# Patient Record
Sex: FEMALE | Employment: OTHER | URBAN - METROPOLITAN AREA
[De-identification: names, ages, dates, MRNs, and addresses within clinical notes are randomized per-mention and may not be internally consistent; named-entity substitution may affect disease eponyms.]

---

## 2017-05-25 ENCOUNTER — OFFICE VISIT (OUTPATIENT)
Dept: RHEUMATOLOGY | Facility: CLINIC | Age: 67
End: 2017-05-25
Attending: INTERNAL MEDICINE
Payer: MEDICARE

## 2017-05-25 VITALS
HEIGHT: 65 IN | WEIGHT: 131 LBS | DIASTOLIC BLOOD PRESSURE: 89 MMHG | OXYGEN SATURATION: 98 % | BODY MASS INDEX: 21.83 KG/M2 | SYSTOLIC BLOOD PRESSURE: 145 MMHG | HEART RATE: 77 BPM

## 2017-05-25 DIAGNOSIS — I15.0 RENOVASCULAR HYPERTENSION: Primary | ICD-10-CM

## 2017-05-25 DIAGNOSIS — M34.9 SCLERODERMA (H): ICD-10-CM

## 2017-05-25 DIAGNOSIS — I73.00 RAYNAUD'S DISEASE WITHOUT GANGRENE: ICD-10-CM

## 2017-05-25 PROCEDURE — 99212 OFFICE O/P EST SF 10 MIN: CPT | Mod: ZF

## 2017-05-25 RX ORDER — ATORVASTATIN CALCIUM 20 MG/1
20 TABLET, FILM COATED ORAL DAILY
Qty: 90 TABLET | Refills: 0 | Status: SHIPPED | OUTPATIENT
Start: 2017-05-25

## 2017-05-25 ASSESSMENT — PAIN SCALES - GENERAL: PAINLEVEL: NO PAIN (0)

## 2017-05-25 NOTE — MR AVS SNAPSHOT
"              After Visit Summary   5/25/2017    Sadie Zapata    MRN: [id number]           Patient Information     Date Of Birth          1950        Visit Information        Provider Department      5/25/2017 10:00 AM Joseph Chery MD [facility] Rheumatology        Today's Diagnoses     Scleroderma (H)        HTN (hypertension)        Raynaud's syndrome           Follow-ups after your visit        Who to contact     If you have questions or need follow up information about today's clinic visit or your schedule please contact [facility] RHEUMATOLOGY directly at [phone].  Normal or non-critical lab and imaging results will be communicated to you by [employer], letter or phone within 4 business days after the clinic has received the results. If you do not hear from us within 7 days, please contact the clinic through [employer] or phone. If you have a critical or abnormal lab result, we will notify you by phone as soon as possible.  Submit refill requests through [employer] or call your pharmacy and they will forward the refill request to us. Please allow 3 business days for your refill to be completed.          Additional Information About Your Visit        MyChart Information     [employer] gives you secure access to your electronic health record. If you see a primary care provider, you can also send messages to your care team and make appointments. If you have questions, please call your primary care clinic.  If you do not have a primary care provider, please call [phone] and they will assist you.        Care EveryWhere ID     This is your Care EveryWhere ID. This could be used by other organizations to access your [place] medical records  [id number]        Your Vitals Were     Pulse Height Pulse Oximetry BMI (Body Mass Index)          77 1.638 m (5' 4.5\") 98% 22.14 kg/m2         Blood Pressure from Last 3 Encounters:   05/25/17 145/89   10/27/15 126/67   10/23/14 135/84    Weight from Last 3 Encounters: "   05/25/17 59.4 kg (131 lb)   10/27/15 56.2 kg (124 lb)   10/23/14 57.6 kg (127 lb)              Today, you had the following     No orders found for display         Today's Medication Changes          These changes are accurate as of: 5/25/17 11:09 AM.  If you have any questions, ask your nurse or doctor.               These medicines have changed or have updated prescriptions.        Dose/Directions    CENTRUM SILVER per tablet   This may have changed:  Another medication with the same name was removed. Continue taking this medication, and follow the directions you see here.   Used for:  Scleroderma (H), HTN (hypertension)   Changed by:  Joseph Chery MD        Dose:  1 tablet   Take 1 tablet by mouth daily   Refills:  0                Primary Care Provider Office Phone # Fax #    Earle Hayden [phone]       [place] [address]        Thank you!     Thank you for choosing [facility] RHEUMATOLOGY  for your care. Our goal is always to provide you with excellent care. Hearing back from our patients is one way we can continue to improve our services. Please take a few minutes to complete the written survey that you may receive in the mail after your visit with us. Thank you!             Your Updated Medication List - Protect others around you: Learn how to safely use, store and throw away your medicines at www.disposemymeds.org.          This list is accurate as of: 5/25/17 11:09 AM.  Always use your most recent med list.                   Brand Name Dispense Instructions for use    atorvastatin 20 MG tablet    LIPITOR    90 tablet    Take 1 tablet (20 mg) by mouth daily       CENTRUM SILVER per tablet      Take 1 tablet by mouth daily       enalapril 2.5 MG tablet    VASOTEC     Take 10 mg by mouth daily       HYDROCHLOROTHIAZIDE PO      Take 25 mg by mouth daily       OMEGA-3 FISH OIL PO      Take 1,400 mg by mouth 2 times daily (with meals)       VIAGRA 50  MG cap/tab   Generic drug:  sildenafil     90 tablet    Take 0.5 tablets (25 mg) by mouth 2 times daily as needed

## 2017-05-25 NOTE — LETTER
5/25/2017       RE: Sadie Zapata  [address]   [place] [id number]     Dear Colleague,    Thank you for referring your patient, Sadie Zapata, to the [facility] RHEUMATOLOGY at [facility]. Please see a copy of my visit note below.    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  Nonetheless, her method of onset of disease back in 2004 suggests this was diffuse cutaneous disease that is now in atrophic phase.      PLAN AND RECOMMENDATION:     She is RNA polymerase III positive, so I have again taken some time today to educate her on the potential significance of that and the need to continue to monitor blood pressure.     We also discussed that for any systemic sclerosis patient, it is probably prudent to do pulmonary function tests every 2-3 years and that once she establishes with a rheumatologist in [place] she probably wants to get set up to do that.      I also discussed with her that it might be reasonable to get off of hydrochlorothiazide and see if her blood pressure can be nicely controlled just with the ACE inhibitor, particularly given that she is now going to be living in a warm humid climate and she has the potentially get somewhat dehydrated with a hydrochlorothiazide regimen that may now be essentially unnecessary.      I did encourage her to get set up with primary care provider in [place].  I can certainly see her and keep her prescriptions going until she establishes with her rheumatologist, but we also gave her the name of a rheumatologist near where she lives who has experienced systemic sclerosis.  She will  consider getting in with him unless she finds someone closer.  She also would get established with a dermatologist given her multiple AKs, and history of multiple sun induced lesions.      I am happy to see her back at any time.  She is doing well and I am not sure she really needs to see me in the next 3 months before they go back to [place].  She will contact me if anything arises however.  This visit was 25 minutes in duration, over 50% spent in counseling.                        History of Present Illness:   Sadie Zapata is a 64 year old female who presents for REASON FOR VISIT:  [place] of care for longstanding diffuse cutaneous systemic sclerosis diagnosed by biopsy in 2004.       HISTORY OF PRESENT ILLNESS:  The patient, as noted, was diagnosed with diffuse cutaneous systemic sclerosis back in 05/2004.  This occurred shortly after she had had right-sided rotator cuff shoulder surgery, and she began developing diffuse swelling of the hands and feet.  She was seen by a dermatologist, who did a skin biopsy and suspected systemic sclerosis, and the skin biopsy was consistent with that.  An NICOLE was performed and was high titer, but Scl-70 antibodies and centromere antibodies were negative.  As far as I can tell, she has never had an RNA polymerase III antibody which was not available at that time.       She went to [place] in 12/2004, where the disease was confirmed.  With no particular specific treatments for it, she was not placed on any treatment at that time.       Since that time, she has remained relatively stable without a lot of severe progressive skin disease or a lot of musculoskeletal symptoms and with no evidence of progressive lung disease of any type.       Indeed, she brings today with her pulmonary function tests just performed this last week at [facility] in [place] which show her FVC at 106% of predicted, DLCO at 106% of predicted at 19.0, and an FVC of 33.3.  Her echocardiogram was also  performed showing a PA systolic pressure estimate of 17.6 mmHg above right atrial pressure with the only real abnormality slightly impaired left ventricular diastolic relaxation as is often seen in systemic sclerosis.  Symptomatically at this point, her major issue is Raynaud's phenomena.  Because her Raynaud's phenomena have been pretty severe affecting both hands and feet, Dr. Gutierrez, who the patient has been seeing since 2004 but who recently retired, has maintained her much of that time on low-dose sildenafil.  The patient typically only takes one-half of a 50-mg tablet a day but feels that does help with Raynaud's severity.      Her Raynaud's has never caused any digital ulcers or other digital ischemia in the fingers or the toes, and she does get attacks in both locations.  She is, however, very careful to ensure that she stays as warm as possible, uses gloves when she is handling very cold items, and breaks her attacks within 20 minutes.       As noted above, she does not have significant musculoskeletal features.  She does have contractures in her fingers, more notably on the left hand than on the right, but has never had ulcerations over the PIP joints or DIP joints.  She has minimal pain or morning stiffness in the hands, really no pain or morning stiffness in any other joints, and has not had other significant musculoskeletal symptoms.       She has never had any significant cardiac symptoms, denies any pleuritis or pericarditis, and to her knowledge has had no significant evidence of renal disease, though she has for many years had quite high blood pressures.  Dr. Gutierrez has maintained her on a very low dose of an ACE inhibitor.  As I noted, she has not had an RNA polymerase III antibody but reports she has had stable creatinine and urinalysis.  I do not have any of those in the recent records, however.       Her blood pressure is currently significantly better than it has been in the past, really only  borderline.  She describes this in no small part to having recently quit work and retired.  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  She has also now walking 2.5 miles a day which she tolerates very well, and she has a stable exercise tolerance when walking.  She uses a home blood pressure cuff once a week, and she says her blood pressures have been stable.      One of her major questions for me today is whether she might be able to get off the proton pump inhibitors.  She has been on those for the major part of the last 10 years.  She has no significant GERD or pyrosis symptomatically.  She has never had an upper endoscopy, however.       She denies other significant gastrointestinal symptoms, particularly denying features suggestive of bacterial small bowel overgrowth such as postprandial bloating, a lot of gas or diarrhea within a few hours of eating.  She really has no diarrhea or constipation and has had a stable weight for a long time.      She has never had calcinosis, never had keratoconjunctivitis sicca, never had neuropathies, and really denies most other features that are often associated with systemic sclerosis or other autoimmune connective tissue disease such as sun sensitivity.  She has never had a blood clot.  She has never had a miscarriage more than 2 months into a pregnancy.     INTERVAL HISTORY (10/27/2015):  Since I saw the patient on the initial visit documented above, she has done well clinically.  We did check her RNA polymerase III status and it was indeed positive in moderate titer.      She has, however, been watching her blood pressure and with the addition of some hydrochlorothiazide to her low-dose ACE inhibitor she is doing very well.  Both creatinine and urinalysis at her last visit were normal.        Her blood pressure today is  126/67 and from her description it  has recently been in the same general neighborhood.  She does remain on enalapril 10 mg a day together with hydrochlorothiazide 25 mg a day.      Her Raynaud's is starting to act up with the onset of colder weather.  She however has, per our last conversation, avoided sildenafil except as needed.  She has not had bad Raynaud's throughout the summer, and it is only in the last several days when it started getting much her that she has a little bit of difficulty.      She plans on taking care of that starting tomorrow by moving to [place].  She and her  have a lot and are building a house.  They plan on staying at least 7, maybe 8 months a year in [place] and coming back here only for the summer where they have a summer cabin.      Other than for her Raynaud's she really denies any significant features of her disease.  She has no significant GERD symptoms or dysphagia, and per my recommendation she did have an upper endoscopy at the time of her last visit when she got home and that was entirely normal, so given her lack of symptoms and the normal endoscopy.  She was told she could stop her PPI and she has remained off of it since with no increase in any of the symptoms, so she remains essentially asymptomatic.      She denies any other GI symptoms, any shortness of breath or cardiopulmonary symptoms of any kind, and really feels very good.      She has established with a scleroderma support group in [place], has not yet established with a physician there, but is discussing that with the support group.     INTERVAL HISTORY (05/25/2017):  Since we have last seen the patient, she has been in [place], [place], where she and her  had a house built, that they finally were able to get into after over a year in December.  They really like it there and are planning on moving there permanently, spending only a month or two here in the summer at a cabin on one of the [facility].  This year she will be here from June  to August.      While in [place] this winter almost all of her symptoms historically from her disease have been completely stable or improved.  She has had virtually no Raynaud's attacks this winter while in [place].      She did stay on the enalapril.  Recall that she has an RNA polymerase III antibody but is carefully monitoring her blood pressure and had no spikes in her blood pressure or trends upward.      She denies any significant GERD or dysphasia.  She has had an upper endoscopy in last several years and that was entirely normal.  She has remained off of PPI given that normal endoscopy and has no symptoms despite being off of it.      She does remain on hydrochlorothiazide together with the ACE inhibitor and she is also on a statin.  She has not yet established with a primary care physician in [place].                   Review of Systems:   Review Of Systems as above, and per parul Askew:   Skin: negative  Eyes: negative  Ears/Nose/Throat: negative  Respiratory: No shortness of breath, dyspnea on exertion, cough, or hemoptysis  Cardiovascular: negative  Gastrointestinal: negative  Genitourinary: negative  Musculoskeletal: negative  Neurologic: negative  Psychiatric: negative  Hematologic/Lymphatic/Immunologic: negative  Endocrine: negative          Past Medical History:     Past Medical History:   Diagnosis Date     Hypertension      Malignant neoplasm (H)     melanoma     Raynaud disease      Scleroderma (H) julianna       Patient Active Problem List    Diagnosis Date Noted     Renovascular hypertension 10/27/2015     Priority: Medium     Scleroderma (H) 12/17/2012     Priority: Medium     Disorder of bursae and tendons in shoulder region 12/17/2012     Priority: Medium     Problem list name updated by automated process. Provider to review               Past Surgical History:     Past Surgical History:   Procedure Laterality Date     CHOLECYSTECTOMY       ENT SURGERY      tonsillectomy & adenoidectomy  "    GENITOURINARY SURGERY      hysterectomy '78     SHOULDER SURGERY      2 on right; 1 on left             Social History:     Social History   Substance Use Topics     Smoking status: Never Smoker     Smokeless tobacco: Never Used     Alcohol use Yes             Family History:   No family history on file.  No autoimmune fHX; CA in several aunts.        Allergies:   No Known Allergies          Medications:     Current Outpatient Prescriptions   Medication Sig Dispense Refill     sildenafil (VIAGRA) 50 MG tablet Take 0.5 tablets (25 mg) by mouth 2 times daily as needed 90 tablet 3     atorvastatin (LIPITOR) 20 MG tablet Take 1 tablet (20 mg) by mouth daily 90 tablet 3     Omega-3 Fatty Acids (OMEGA-3 FISH OIL PO) Take 1,400 mg by mouth 2 times daily (with meals)       Multiple Vitamins-Minerals (CENTRUM SILVER) per tablet Take 1 tablet by mouth daily       HYDROCHLOROTHIAZIDE PO Take 25 mg by mouth daily       enalapril (VASOTEC) 2.5 MG tablet Take 10 mg by mouth daily               Physical Exam:   Blood pressure 145/89, pulse 77, height 1.638 m (5' 4.5\"), weight 59.4 kg (131 lb), SpO2 98 %.  Constitutional: WD-WN-WG cooperative  Eyes: nl EOM, PERRLA, vision, conjunctiva, sclera  ENT: nl external ears, nose, hearing, lips, teeth, gums, throat  No mucous membrane lesions, normal saliva pool  Neck: no mass or thyroid enlargement  Resp: lungs clear to auscultation, nl to palpation  CV: RRR, no murmurs, rubs or gallops, no edema  GI: no ABD mass or tenderness, no HSM  : not tested  Lymph: no cervical, supraclavicular, inguinal or epitrochlear nodes  MS: All TMJ, neck, shoulder, elbow, wrist, MCP/PIP/DIP, spine, hip, knee, ankle, and foot MTP/IP joints were examined and  found normal. Trace synovitis, NT in bilat. 2,3 MCPs, slight contractures right fingers, approx 30 degree contractures left ifingers worst in 3rd finger. No other active synovitis or deformity. Full ROM.  Normal  strength. No dactylitis,  " tenosynovitis, enthespathy; no tendon rubs.    Skin: active RP bilat hands and feet sans DU.  She has diffusely atrophic skin with multiple areas of telangiectasias over her face, chest, hands and arms, in particular.  She also has many moles and other skin lesions from sun exposure.  She really only has low-grade skin thickening in the fingers and the face other than for her contractures but has active cyanosis consistent with Raynaud's phenomena in the hands and the feet.  She has no distal digital ulcers or pitting and no evidence of any calcinosis lesions.     no nail pitting, alopecia, rash, nodules or lesions  Neuro: nl cranial nerves, strength, sensation, DTRs.   Psych: nl judgement, orientation, memory, affect.         Data:     Lab Results   Component Value Date    WBC 7.2 3/27/2006    WBC 6.4 2/17/2005    HGB 14.4 3/27/2006    HGB 14.5 2/17/2005    HCT 42.5 3/27/2006    HCT 41.7 2/17/2005    MCV 93 3/27/2006    MCV 92 2/17/2005     3/27/2006     2/17/2005     No results found for this basename: BUN, CREATININE     No components found with this basename: SEDRATE     No results found for this basename: TSH     Lab Results   Component Value Date    AST 26 2/17/2005    ALT 17 2/17/2005     Reviewed Rheumatology lab flowsheet      Again, thank you for allowing me to participate in the care of your patient.      Sincerely,    Joseph Chery MD

## 2017-05-25 NOTE — PROGRESS NOTES
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  Nonetheless, her method of onset of disease back in 2004 suggests this was diffuse cutaneous disease that is now in atrophic phase.      PLAN AND RECOMMENDATION:     She is RNA polymerase III positive, so I have again taken some time today to educate her on the potential significance of that and the need to continue to monitor blood pressure.     We also discussed that for any systemic sclerosis patient, it is probably prudent to do pulmonary function tests every 2-3 years and that once she establishes with a rheumatologist in [place] she probably wants to get set up to do that.      I also discussed with her that it might be reasonable to get off of hydrochlorothiazide and see if her blood pressure can be nicely controlled just with the ACE inhibitor, particularly given that she is now going to be living in a warm humid climate and she has the potentially get somewhat dehydrated with a hydrochlorothiazide regimen that may now be essentially unnecessary.      I did encourage her to get set up with primary care provider in [place].  I can certainly see her and keep her prescriptions going until she establishes with her rheumatologist, but we also gave her the name of a rheumatologist near where she lives who has experienced systemic sclerosis.  She will consider getting in with him unless she finds someone closer.  She also would get established with a dermatologist given her multiple AKs, and history of multiple sun induced lesions.      I am happy to see her back at any time.  She is doing well and I am not sure she  really needs to see me in the next 3 months before they go back to [place].  She will contact me if anything arises however.  This visit was 25 minutes in duration, over 50% spent in counseling.                        History of Present Illness:   Sadie Zapata is a 64 year old female who presents for REASON FOR VISIT:  [place] of care for longstanding diffuse cutaneous systemic sclerosis diagnosed by biopsy in 2004.       HISTORY OF PRESENT ILLNESS:  The patient, as noted, was diagnosed with diffuse cutaneous systemic sclerosis back in 05/2004.  This occurred shortly after she had had right-sided rotator cuff shoulder surgery, and she began developing diffuse swelling of the hands and feet.  She was seen by a dermatologist, who did a skin biopsy and suspected systemic sclerosis, and the skin biopsy was consistent with that.  An NICOLE was performed and was high titer, but Scl-70 antibodies and centromere antibodies were negative.  As far as I can tell, she has never had an RNA polymerase III antibody which was not available at that time.       She went to [place] in 12/2004, where the disease was confirmed.  With no particular specific treatments for it, she was not placed on any treatment at that time.       Since that time, she has remained relatively stable without a lot of severe progressive skin disease or a lot of musculoskeletal symptoms and with no evidence of progressive lung disease of any type.       Indeed, she brings today with her pulmonary function tests just performed this last week at [facility] in [place] which show her FVC at 106% of predicted, DLCO at 106% of predicted at 19.0, and an FVC of 33.3.  Her echocardiogram was also performed showing a PA systolic pressure estimate of 17.6 mmHg above right atrial pressure with the only real abnormality slightly impaired left ventricular diastolic relaxation as is often seen in systemic sclerosis.  Symptomatically at this point, her major issue is  Raynaud's phenomena.  Because her Raynaud's phenomena have been pretty severe affecting both hands and feet, Dr. Gutierrez, who the patient has been seeing since 2004 but who recently retired, has maintained her much of that time on low-dose sildenafil.  The patient typically only takes one-half of a 50-mg tablet a day but feels that does help with Raynaud's severity.      Her Raynaud's has never caused any digital ulcers or other digital ischemia in the fingers or the toes, and she does get attacks in both locations.  She is, however, very careful to ensure that she stays as warm as possible, uses gloves when she is handling very cold items, and breaks her attacks within 20 minutes.       As noted above, she does not have significant musculoskeletal features.  She does have contractures in her fingers, more notably on the left hand than on the right, but has never had ulcerations over the PIP joints or DIP joints.  She has minimal pain or morning stiffness in the hands, really no pain or morning stiffness in any other joints, and has not had other significant musculoskeletal symptoms.       She has never had any significant cardiac symptoms, denies any pleuritis or pericarditis, and to her knowledge has had no significant evidence of renal disease, though she has for many years had quite high blood pressures.  Dr. Gutierrez has maintained her on a very low dose of an ACE inhibitor.  As I noted, she has not had an RNA polymerase III antibody but reports she has had stable creatinine and urinalysis.  I do not have any of those in the recent records, however.       Her blood pressure is currently significantly better than it has been in the past, really only borderline.  She describes this in no small part to having recently quit work and retired.  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  She has also now  walking 2.5 miles a day which she tolerates very well, and she has a stable exercise tolerance when walking.  She uses a home blood pressure cuff once a week, and she says her blood pressures have been stable.      One of her major questions for me today is whether she might be able to get off the proton pump inhibitors.  She has been on those for the major part of the last 10 years.  She has no significant GERD or pyrosis symptomatically.  She has never had an upper endoscopy, however.       She denies other significant gastrointestinal symptoms, particularly denying features suggestive of bacterial small bowel overgrowth such as postprandial bloating, a lot of gas or diarrhea within a few hours of eating.  She really has no diarrhea or constipation and has had a stable weight for a long time.      She has never had calcinosis, never had keratoconjunctivitis sicca, never had neuropathies, and really denies most other features that are often associated with systemic sclerosis or other autoimmune connective tissue disease such as sun sensitivity.  She has never had a blood clot.  She has never had a miscarriage more than 2 months into a pregnancy.     INTERVAL HISTORY (10/27/2015):  Since I saw the patient on the initial visit documented above, she has done well clinically.  We did check her RNA polymerase III status and it was indeed positive in moderate titer.      She has, however, been watching her blood pressure and with the addition of some hydrochlorothiazide to her low-dose ACE inhibitor she is doing very well.  Both creatinine and urinalysis at her last visit were normal.        Her blood pressure today is  126/67 and from her description it has recently been in the same general neighborhood.  She does remain on enalapril 10 mg a day together with hydrochlorothiazide 25 mg a day.      Her Raynaud's is starting to act up with the onset of colder weather.  She however has, per our last conversation, avoided  sildenafil except as needed.  She has not had bad Raynaud's throughout the summer, and it is only in the last several days when it started getting much her that she has a little bit of difficulty.      She plans on taking care of that starting tomorrow by moving to [place].  She and her  have a lot and are building a house.  They plan on staying at least 7, maybe 8 months a year in [place] and coming back here only for the summer where they have a summer cabin.      Other than for her Raynaud's she really denies any significant features of her disease.  She has no significant GERD symptoms or dysphagia, and per my recommendation she did have an upper endoscopy at the time of her last visit when she got home and that was entirely normal, so given her lack of symptoms and the normal endoscopy.  She was told she could stop her PPI and she has remained off of it since with no increase in any of the symptoms, so she remains essentially asymptomatic.      She denies any other GI symptoms, any shortness of breath or cardiopulmonary symptoms of any kind, and really feels very good.      She has established with a scleroderma support group in [place], has not yet established with a physician there, but is discussing that with the support group.     INTERVAL HISTORY (05/25/2017):  Since we have last seen the patient, she has been in [place], [place], where she and her  had a house built, that they finally were able to get into after over a year in December.  They really like it there and are planning on moving there permanently, spending only a month or two here in the summer at a cabin on [place] of [place] [facility].  This year she will be here from June to August.      While in [place] this winter almost all of her symptoms historically from her disease have been completely stable or improved.  She has had virtually no Raynaud's attacks this winter while in [place].      She did stay on the enalapril.  Recall that  she has an RNA polymerase III antibody but is carefully monitoring her blood pressure and had no spikes in her blood pressure or trends upward.      She denies any significant GERD or dysphasia.  She has had an upper endoscopy in last several years and that was entirely normal.  She has remained off of PPI given that normal endoscopy and has no symptoms despite being off of it.      She does remain on hydrochlorothiazide together with the ACE inhibitor and she is also on a statin.  She has not yet established with a primary care physician in [place].                   Review of Systems:   Review Of Systems as above, and per parul Askew:   Skin: negative  Eyes: negative  Ears/Nose/Throat: negative  Respiratory: No shortness of breath, dyspnea on exertion, cough, or hemoptysis  Cardiovascular: negative  Gastrointestinal: negative  Genitourinary: negative  Musculoskeletal: negative  Neurologic: negative  Psychiatric: negative  Hematologic/Lymphatic/Immunologic: negative  Endocrine: negative          Past Medical History:     Past Medical History:   Diagnosis Date     Hypertension      Malignant neoplasm (H)     melanoma     Raynaud disease      Scleroderma (H) julianna       Patient Active Problem List    Diagnosis Date Noted     Renovascular hypertension 10/27/2015     Priority: Medium     Scleroderma (H) 12/17/2012     Priority: Medium     Disorder of bursae and tendons in shoulder region 12/17/2012     Priority: Medium     Problem list name updated by automated process. Provider to review               Past Surgical History:     Past Surgical History:   Procedure Laterality Date     CHOLECYSTECTOMY       ENT SURGERY      tonsillectomy & adenoidectomy     GENITOURINARY SURGERY      hysterectomy '78     SHOULDER SURGERY      2 on right; 1 on left             Social History:     Social History   Substance Use Topics     Smoking status: Never Smoker     Smokeless tobacco: Never Used     Alcohol use Yes              "Family History:   No family history on file.  No autoimmune fHX; CA in several aunts.        Allergies:   No Known Allergies          Medications:     Current Outpatient Prescriptions   Medication Sig Dispense Refill     sildenafil (VIAGRA) 50 MG tablet Take 0.5 tablets (25 mg) by mouth 2 times daily as needed 90 tablet 3     atorvastatin (LIPITOR) 20 MG tablet Take 1 tablet (20 mg) by mouth daily 90 tablet 3     Omega-3 Fatty Acids (OMEGA-3 FISH OIL PO) Take 1,400 mg by mouth 2 times daily (with meals)       Multiple Vitamins-Minerals (CENTRUM SILVER) per tablet Take 1 tablet by mouth daily       HYDROCHLOROTHIAZIDE PO Take 25 mg by mouth daily       enalapril (VASOTEC) 2.5 MG tablet Take 10 mg by mouth daily               Physical Exam:   Blood pressure 145/89, pulse 77, height 1.638 m (5' 4.5\"), weight 59.4 kg (131 lb), SpO2 98 %.  Constitutional: WD-WN-WG cooperative  Eyes: nl EOM, PERRLA, vision, conjunctiva, sclera  ENT: nl external ears, nose, hearing, lips, teeth, gums, throat  No mucous membrane lesions, normal saliva pool  Neck: no mass or thyroid enlargement  Resp: lungs clear to auscultation, nl to palpation  CV: RRR, no murmurs, rubs or gallops, no edema  GI: no ABD mass or tenderness, no HSM  : not tested  Lymph: no cervical, supraclavicular, inguinal or epitrochlear nodes  MS: All TMJ, neck, shoulder, elbow, wrist, MCP/PIP/DIP, spine, hip, knee, ankle, and foot MTP/IP joints were examined and  found normal. Trace synovitis, NT in bilat. 2,3 MCPs, slight contractures right fingers, approx 30 degree contractures left ifingers worst in 3rd finger. No other active synovitis or deformity. Full ROM.  Normal  strength. No dactylitis,  tenosynovitis, enthespathy; no tendon rubs.    Skin: active RP bilat hands and feet sans DU.  She has diffusely atrophic skin with multiple areas of telangiectasias over her face, chest, hands and arms, in particular.  She also has many moles and other skin lesions from " sun exposure.  She really only has low-grade skin thickening in the fingers and the face other than for her contractures but has active cyanosis consistent with Raynaud's phenomena in the hands and the feet.  She has no distal digital ulcers or pitting and no evidence of any calcinosis lesions.     no nail pitting, alopecia, rash, nodules or lesions  Neuro: nl cranial nerves, strength, sensation, DTRs.   Psych: nl judgement, orientation, memory, affect.         Data:     Lab Results   Component Value Date    WBC 7.2 3/27/2006    WBC 6.4 2/17/2005    HGB 14.4 3/27/2006    HGB 14.5 2/17/2005    HCT 42.5 3/27/2006    HCT 41.7 2/17/2005    MCV 93 3/27/2006    MCV 92 2/17/2005     3/27/2006     2/17/2005     No results found for this basename: BUN, CREATININE     No components found with this basename: SEDRATE     No results found for this basename: TSH     Lab Results   Component Value Date    AST 26 2/17/2005    ALT 17 2/17/2005     Reviewed Rheumatology lab flowsheet    Joseph Chery

## 2017-05-25 NOTE — NURSING NOTE
"Chief Complaint   Patient presents with     RECHECK       Initial /89  Pulse 77  Ht 1.638 m (5' 4.5\")  Wt 59.4 kg (131 lb)  SpO2 98%  BMI 22.14 kg/m2 Estimated body mass index is 22.14 kg/(m^2) as calculated from the following:    Height as of this encounter: 1.638 m (5' 4.5\").    Weight as of this encounter: 59.4 kg (131 lb).  Medication Reconciliation: complete  Medications have been reviewed by me and are current to the best of my knowledge and ability.    "

## 2017-05-25 NOTE — NURSING NOTE
"Chief Complaint   Patient presents with     RECHECK     scleroderma       Initial /89  Pulse 77  Ht 1.638 m (5' 4.5\")  Wt 59.4 kg (131 lb)  SpO2 98%  BMI 22.14 kg/m2 Estimated body mass index is 22.14 kg/(m^2) as calculated from the following:    Height as of this encounter: 1.638 m (5' 4.5\").    Weight as of this encounter: 59.4 kg (131 lb).  Medication Reconciliation: complete  Medications have been reviewed by me and are current to the best of my knowledge and ability.    "

## 2017-08-23 DIAGNOSIS — M34.9 SCLERODERMA (H): ICD-10-CM

## 2017-08-23 DIAGNOSIS — I15.0 RENOVASCULAR HYPERTENSION: ICD-10-CM

## 2017-08-23 DIAGNOSIS — I73.00 RAYNAUD'S DISEASE WITHOUT GANGRENE: ICD-10-CM

## 2017-08-23 NOTE — TELEPHONE ENCOUNTER
atorvastatin (LIPITOR) 20 MG tablet      Last Written Prescription Date:  5/27/17  Last Fill Quantity: 90,   # refills: 0  Last Office Visit : 5/27/17  Future Office visit:  None. Plans to establish care in [place].    Routing refill request to provider for review/approval because:  Overdue AST and ALT labs. Per protocol, must be WNL within the last 6 months.      Lab Results   Component Value Date    AST 26 02/17/2005     Lab Results   Component Value Date    ALT 17 02/17/2005     Anuradha Resendiz RN, BSN.  8/23/2017 12:32 PM

## 2017-08-31 RX ORDER — ATORVASTATIN CALCIUM 20 MG/1
20 TABLET, FILM COATED ORAL DAILY
Qty: 90 TABLET | Refills: 1 | OUTPATIENT
Start: 2017-08-31

## 2019-04-15 ENCOUNTER — APPOINTMENT (RX ONLY)
Dept: URBAN - METROPOLITAN AREA CLINIC 151 | Facility: CLINIC | Age: 69
Setting detail: DERMATOLOGY
End: 2019-04-15

## 2019-04-15 DIAGNOSIS — Z85.820 PERSONAL HISTORY OF MALIGNANT MELANOMA OF SKIN: ICD-10-CM

## 2019-04-15 DIAGNOSIS — M34.83 SYSTEMIC SCLEROSIS WITH POLYNEUROPATHY: ICD-10-CM

## 2019-04-15 DIAGNOSIS — D18.0 HEMANGIOMA: ICD-10-CM

## 2019-04-15 DIAGNOSIS — L81.4 OTHER MELANIN HYPERPIGMENTATION: ICD-10-CM

## 2019-04-15 DIAGNOSIS — L57.0 ACTINIC KERATOSIS: ICD-10-CM

## 2019-04-15 PROBLEM — D18.01 HEMANGIOMA OF SKIN AND SUBCUTANEOUS TISSUE: Status: ACTIVE | Noted: 2019-04-15

## 2019-04-15 PROCEDURE — ? DEFER

## 2019-04-15 PROCEDURE — 99202 OFFICE O/P NEW SF 15 MIN: CPT

## 2019-04-15 PROCEDURE — ? COUNSELING

## 2019-04-15 ASSESSMENT — LOCATION SIMPLE DESCRIPTION DERM
LOCATION SIMPLE: LEFT FOOT
LOCATION SIMPLE: LEFT CALF
LOCATION SIMPLE: RIGHT HAND
LOCATION SIMPLE: RIGHT FOREARM
LOCATION SIMPLE: RIGHT PRETIBIAL REGION
LOCATION SIMPLE: LEFT FOREARM
LOCATION SIMPLE: LEFT HAND
LOCATION SIMPLE: LEFT PRETIBIAL REGION
LOCATION SIMPLE: ABDOMEN
LOCATION SIMPLE: LEFT UPPER BACK
LOCATION SIMPLE: RIGHT ANKLE
LOCATION SIMPLE: CHEST

## 2019-04-15 ASSESSMENT — LOCATION DETAILED DESCRIPTION DERM
LOCATION DETAILED: LEFT LATERAL ABDOMEN
LOCATION DETAILED: RIGHT MEDIAL SUPERIOR CHEST
LOCATION DETAILED: LEFT DORSAL FOOT
LOCATION DETAILED: RIGHT RIB CAGE
LOCATION DETAILED: RIGHT DISTAL PRETIBIAL REGION
LOCATION DETAILED: RIGHT PROXIMAL PRETIBIAL REGION
LOCATION DETAILED: LEFT PROXIMAL CALF
LOCATION DETAILED: RIGHT ANKLE
LOCATION DETAILED: LEFT SUPERIOR UPPER BACK
LOCATION DETAILED: LEFT RADIAL DORSAL HAND
LOCATION DETAILED: LEFT DISTAL PRETIBIAL REGION
LOCATION DETAILED: RIGHT PROXIMAL DORSAL FOREARM
LOCATION DETAILED: RIGHT DISTAL DORSAL FOREARM
LOCATION DETAILED: RIGHT ULNAR DORSAL HAND
LOCATION DETAILED: LEFT PROXIMAL RADIAL DORSAL FOREARM

## 2019-04-15 ASSESSMENT — LOCATION ZONE DERM
LOCATION ZONE: HAND
LOCATION ZONE: LEG
LOCATION ZONE: FEET
LOCATION ZONE: TRUNK
LOCATION ZONE: ARM

## 2019-04-15 NOTE — HPI: MELANOMA F/U (HISTORY OF MALIGNANT MELANOMA)
What Is The Reason For Today's Visit?: History of Melanoma
Year Excised?: 35 years ago patient stated

## 2019-04-17 ENCOUNTER — APPOINTMENT (RX ONLY)
Dept: URBAN - METROPOLITAN AREA CLINIC 151 | Facility: CLINIC | Age: 69
Setting detail: DERMATOLOGY
End: 2019-04-17

## 2019-04-17 DIAGNOSIS — L57.0 ACTINIC KERATOSIS: ICD-10-CM

## 2019-04-17 PROCEDURE — 96567 PDT DSTR PRMLG LES SKN: CPT

## 2019-04-17 PROCEDURE — ? PDT: BLUE

## 2019-04-17 ASSESSMENT — LOCATION DETAILED DESCRIPTION DERM
LOCATION DETAILED: LEFT PROXIMAL DORSAL FOREARM
LOCATION DETAILED: RIGHT PROXIMAL DORSAL FOREARM

## 2019-04-17 ASSESSMENT — LOCATION ZONE DERM: LOCATION ZONE: ARM

## 2019-04-17 ASSESSMENT — LOCATION SIMPLE DESCRIPTION DERM
LOCATION SIMPLE: LEFT FOREARM
LOCATION SIMPLE: RIGHT FOREARM

## 2019-04-17 NOTE — PROCEDURE: PDT: BLUE
Which Photosensitizer Was Used: Levulan
Incubation Start Time: 1:30pm
Debridement Text (Will Only Render In Visit Note If You Select Debridement Option Under Who Performed The Pdt Field): Prior to application of the photodynamic medication the hyperkeratotic lesions were curetted to make them more amenable to therapy.
Occlusion: No
Show Medical Necessity In Plan?: Yes
Incubation End Time: 4:30pm
Who Performed The Pdt?: Performed by Nurse, MA or Aesthetician ([id number])
Anesthesia Type: 1% lidocaine with epinephrine
Illumination Time: 00:17:15
Pre-Procedure Text: The treatment areas were cleaned and prepped in the usual fashion.
Medical Necessity: Precancerous Lesions
Consent: Written consent obtained.  The risks were reviewed with the patient including but not limited to: pigmentary changes, pain, blistering, scabbing, redness, and the remote possibility of scarring.
Post-Care Instructions: I reviewed with the patient in detail post-care instructions. Patient is to avoid sunlight for the next 2 days, and wear sun protection. Patients may expect sunburn like redness, discomfort and scabbing.
Light Source: Barron-U
Incubation Time: 03:00:00
Detail Level: Zone
Total Number Of Aks Treated (Optional To Report): 0
Treatment Number: 1

## 2019-04-19 ENCOUNTER — APPOINTMENT (RX ONLY)
Dept: URBAN - METROPOLITAN AREA CLINIC 151 | Facility: CLINIC | Age: 69
Setting detail: DERMATOLOGY
End: 2019-04-19

## 2019-04-19 DIAGNOSIS — L57.0 ACTINIC KERATOSIS: ICD-10-CM

## 2019-04-19 PROCEDURE — ? PDT: BLUE

## 2019-04-19 PROCEDURE — 96567 PDT DSTR PRMLG LES SKN: CPT

## 2019-04-19 ASSESSMENT — LOCATION SIMPLE DESCRIPTION DERM: LOCATION SIMPLE: CHEST

## 2019-04-19 ASSESSMENT — LOCATION ZONE DERM: LOCATION ZONE: TRUNK

## 2019-04-19 ASSESSMENT — LOCATION DETAILED DESCRIPTION DERM: LOCATION DETAILED: UPPER STERNUM

## 2019-04-19 NOTE — PROCEDURE: PDT: BLUE
Number Of Kerasticks/Tubes Billed For: 1
Treatment Number: 0
Incubation Time: 03:00
Detail Level: Zone
Anesthesia Type: 1% lidocaine with epinephrine
Incubation End Time: 3:00
Occlusion: No
Show Medical Necessity In Plan?: Yes
Which Photosensitizer Was Used: Levulan
Incubation Start Time: 12:00
Illumination Time: 00:17:15
Pre-Procedure Text: The treatment areas were cleaned and prepped in the usual fashion.
Debridement Text (Will Only Render In Visit Note If You Select Debridement Option Under Who Performed The Pdt Field): Prior to application of the photodynamic medication the hyperkeratotic lesions were curetted to make them more amenable to therapy.
Who Performed The Pdt?: Performed by Nurse, MA or Aesthetician ([id number])
Post-Care Instructions: I reviewed with the patient in detail post-care instructions. Patient is to avoid sunlight for the next 2 days, and wear sun protection. Patients may expect sunburn like redness, discomfort and scabbing.
Light Source: Barron-U
Consent: Written consent obtained.  The risks were reviewed with the patient including but not limited to: pigmentary changes, pain, blistering, scabbing, redness, and the remote possibility of scarring.
Medical Necessity: Precancerous Lesions

## 2019-04-24 ENCOUNTER — APPOINTMENT (RX ONLY)
Dept: URBAN - METROPOLITAN AREA CLINIC 151 | Facility: CLINIC | Age: 69
Setting detail: DERMATOLOGY
End: 2019-04-24

## 2019-04-24 DIAGNOSIS — L57.0 ACTINIC KERATOSIS: ICD-10-CM

## 2019-04-24 PROCEDURE — ? PDT: BLUE

## 2019-04-24 PROCEDURE — 96567 PDT DSTR PRMLG LES SKN: CPT

## 2019-04-24 ASSESSMENT — LOCATION DETAILED DESCRIPTION DERM
LOCATION DETAILED: LEFT DISTAL PRETIBIAL REGION
LOCATION DETAILED: RIGHT DISTAL PRETIBIAL REGION

## 2019-04-24 ASSESSMENT — LOCATION ZONE DERM: LOCATION ZONE: LEG

## 2019-04-24 ASSESSMENT — LOCATION SIMPLE DESCRIPTION DERM
LOCATION SIMPLE: LEFT PRETIBIAL REGION
LOCATION SIMPLE: RIGHT PRETIBIAL REGION

## 2019-04-24 NOTE — PROCEDURE: PDT: BLUE
Pre-Procedure Text: The treatment areas were cleaned and prepped in the usual fashion.
Incubation End Time: 11:45
Debridement Text (Will Only Render In Visit Note If You Select Debridement Option Under Who Performed The Pdt Field): Prior to application of the photodynamic medication the hyperkeratotic lesions were curetted to make them more amenable to therapy.
Anesthesia Type: 1% lidocaine with epinephrine
Who Performed The Pdt?: Performed by Nurse, MA or Aesthetician ([id number])
Was Levulan/Ameluz Applied On A Previous Day?: No
Post-Care Instructions: I reviewed with the patient in detail post-care instructions. Patient is to avoid sunlight for the next 2 days, and wear sun protection. Patients may expect sunburn like redness, discomfort and scabbing.
Consent: Written consent obtained.  The risks were reviewed with the patient including but not limited to: pigmentary changes, pain, blistering, scabbing, redness, and the remote possibility of scarring.
Medical Necessity: Precancerous Lesions
Illumination Time: 00:17:15
Show Anesthesia In Plan?: Yes
Treatment Number: 0
Light Source: Barron-U
Incubation Time: 03:00
Detail Level: Zone
Which Photosensitizer Was Used: Levulan
Number Of Kerasticks/Tubes Billed For: 1
Incubation Start Time: 08:45

## 2019-11-06 ENCOUNTER — HEALTH MAINTENANCE LETTER (OUTPATIENT)
Age: 69
End: 2019-11-06

## 2020-02-16 ENCOUNTER — HEALTH MAINTENANCE LETTER (OUTPATIENT)
Age: 70
End: 2020-02-16

## 2020-11-29 ENCOUNTER — HEALTH MAINTENANCE LETTER (OUTPATIENT)
Age: 70
End: 2020-11-29

## 2021-04-10 ENCOUNTER — HEALTH MAINTENANCE LETTER (OUTPATIENT)
Age: 71
End: 2021-04-10

## 2021-09-19 ENCOUNTER — HEALTH MAINTENANCE LETTER (OUTPATIENT)
Age: 71
End: 2021-09-19

## 2021-11-14 ENCOUNTER — HEALTH MAINTENANCE LETTER (OUTPATIENT)
Age: 71
End: 2021-11-14

## 2022-02-08 ENCOUNTER — APPOINTMENT (RX ONLY)
Dept: URBAN - METROPOLITAN AREA CLINIC 331 | Facility: CLINIC | Age: 72
Setting detail: DERMATOLOGY
End: 2022-02-08

## 2022-02-08 PROBLEM — C44.722 SQUAMOUS CELL CARCINOMA OF SKIN OF RIGHT LOWER LIMB, INCLUDING HIP: Status: ACTIVE | Noted: 2022-02-08

## 2022-02-08 PROCEDURE — ? ADDITIONAL NOTES

## 2022-02-08 PROCEDURE — 15275 SKIN SUB GRAFT FACE/NK/HF/G: CPT

## 2022-02-08 PROCEDURE — ? MOHS SURGERY

## 2022-02-08 PROCEDURE — 17311 MOHS 1 STAGE H/N/HF/G: CPT

## 2022-02-08 NOTE — PROCEDURE: MOHS SURGERY
Body Location Override (Optional - Billing Will Still Be Based On Selected Body Map Location If Applicable): right med foot

## 2022-02-08 NOTE — PROCEDURE: MOHS SURGERY
Consent 2/Introductory Paragraph: The rationale for Mohs surgery was explained to the patient and written informed consent was obtained. The risks, benefits, and alternatives to Mohs Surgery were discussed in detail. Specifically, the risks of infection, scarring, bleeding, prolonged wound healing, incomplete removal, allergy to anesthesia, nerve injury, and recurrence were addressed. Given the site on the leg, the patient was also counseled about the high risk of failure of surgical repair, wound falling apart (dehiscence), infection, and chronic wound formation, which could take months or longer to heal. The patient expressed understanding and desired to proceed. Prior to beginning the procedure, the surgical site was identified with the assistance of the patient and the medical record, including photographs and/or maps if available. The site was then clearly confirmed by the patient by direct visualization and/or the use of a hand mirror and a cotton-tipped applicator stick. All components of Universal Protocol/PAUSE Rule were completed: Prior to beginning the procedure, a pause was taken during which the surgeon, his assistant, and the patient verbally confirmed the patient's identification, the intended procedure, and the correct surgical site.

## 2022-02-08 NOTE — HPI: PROCEDURE (MOHS)
Has The Growth Been Previously Biopsied?: has been previously biopsied
Body Location Override (Optional): Right med foot

## 2022-02-08 NOTE — PROCEDURE: MOHS SURGERY
Mohs Histo Method Verbiage: The tissue was marked with dyes for orientation, mapped on an image, frozen, stained with toluidine blue, and microscopically examined by the surgeon.

## 2022-02-08 NOTE — PROCEDURE: MOHS SURGERY
Area M Indication Text: Tumors in this location are included in Area M (cheek, forehead, scalp, neck, jawline, and pretibial skin). Mohs surgery is indicated for tumors in these anatomic locations. Tissue conservation is critical in these anatomic locations to maximize functional and aesthetic outcomes.

## 2022-02-08 NOTE — PROCEDURE: MOHS SURGERY
Consent (Ear)/Introductory Paragraph: The rationale for Mohs was explained to the patient and consent was obtained. The risks, benefits and alternatives to therapy were discussed in detail. Specifically, the risks of ear deformity, infection, scarring, bleeding, prolonged wound healing, incomplete removal, allergy to anesthesia, nerve injury and recurrence were addressed. Prior to beginning the procedure, the surgical site was identified with the assistance of the patient and the medical record, including photographs and/or maps if available. The site was then clearly confirmed by the patient by direct visualization and/or the use of a hand mirror and a cotton-tipped applicator stick. All components of Universal Protocol/PAUSE Rule were completed: Prior to beginning the procedure, a pause was taken during which the surgeon, his assistant, and the patient verbally confirmed the patient's identification, the intended procedure, and the correct surgical site.

## 2022-02-08 NOTE — PROCEDURE: MOHS SURGERY
Medical Necessity Statement: Based on my medical judgement, Mohs surgery is medically necessary as it is the most appropriate treatment for this cancer compared to other treatments.

## 2022-02-08 NOTE — PROCEDURE: MOHS SURGERY
Consent (Spinal Accessory)/Introductory Paragraph: The rationale for Mohs surgery was explained to the patient and written informed consent was obtained. The risks, benefits, and alternatives to Mohs Surgery were discussed in detail. Specifically, the risks of infection, scarring, bleeding, prolonged wound healing, incomplete removal, allergy to anesthesia, nerve injury, and recurrence were addressed. Given the site, the patient was also counseled about the risk of damage to the spinal accessory nerve, potentially permanently resulting in chronic aching of the shoulders, inability to abduct the shoulder, wasting of the shoulder (trapezius) muscles, dropped shoulder, and paresthesias in the arm. The patient expressed understanding and desired to proceed. Prior to beginning the procedure, the surgical site was identified with the assistance of the patient and the medical record, including photographs and/or maps if available. The site was then clearly confirmed by the patient by direct visualization and/or the use of a hand mirror and a cotton-tipped applicator stick. All components of Universal Protocol/PAUSE Rule were completed: Prior to beginning the procedure, a pause was taken during which the surgeon, his assistant, and the patient verbally confirmed the patient's identification, the intended procedure, and the correct surgical site.

## 2022-02-08 NOTE — PROCEDURE: MOHS SURGERY
Mohs Method Verbiage: Mohs micrographic surgery was performed in the standard fashion with all frozen sections examined by the surgeon.  The discernable tumor mass and a narrow margin of surrounding skin, was excised as a microscopically-controlled section.

## 2022-02-08 NOTE — PROCEDURE: MOHS SURGERY
Consent (Scalp)/Introductory Paragraph: The rationale for Mohs was explained to the patient and consent was obtained. The risks, benefits and alternatives to therapy were discussed in detail. Specifically, the risks of changes in hair growth pattern secondary to repair, infection, scarring, bleeding, prolonged wound healing, incomplete removal, allergy to anesthesia, nerve injury and recurrence were addressed. Prior to beginning the procedure, the surgical site was identified with the assistance of the patient and the medical record, including photographs and/or maps if available. The site was then clearly confirmed by the patient by direct visualization and/or the use of a hand mirror and a cotton-tipped applicator stick. All components of Universal Protocol/PAUSE Rule were completed: Prior to beginning the procedure, a pause was taken during which the surgeon, his assistant, and the patient verbally confirmed the patient's identification, the intended procedure, and the correct surgical site.

## 2022-02-08 NOTE — PROCEDURE: MOHS SURGERY
Graft Donor Site Bandage (Optional-Leave Blank If You Don't Want In Note): Hemostasis was meticulously ensured achieved, gelfoam was applied, and a pressure dressing was placed.

## 2022-02-08 NOTE — PROCEDURE: MOHS SURGERY
Complex Repair Preamble Text (Leave Blank If You Do Not Want): To reduce the postoperative risks of hemorrhage, scarring, and infection, a complex linear repair was deemed to be medically necessary.

## 2022-02-08 NOTE — PROCEDURE: MOHS SURGERY
Intermediate Repair Preamble Text (Leave Blank If You Do Not Want): To reduce the postoperative risks of hemorrhage, scarring, and infection, an intermediate linear repair was deemed to be medically necessary.

## 2022-02-08 NOTE — PROCEDURE: MOHS SURGERY
Consent 3/Introductory Paragraph: The rationale for Mohs surgery was explained to the patient and written informed consent was obtained. The risks, benefits, and alternatives to Mohs Surgery were discussed in detail. Specifically, the risks of infection, scarring, bleeding, prolonged wound healing, incomplete removal, allergy to anesthesia, nerve injury, and recurrence were addressed. Given the site, the patient was also counseled about the risk of nerve damage, which could result in potentially permanent tingling, paresthesia, or lack of sensation on this side of the forehead and / or scalp. The patient expressed understanding and desired to proceed. Prior to beginning the procedure, the surgical site was identified with the assistance of the patient and the medical record, including photographs and/or maps if available. The site was then clearly confirmed by the patient by direct visualization and/or the use of a hand mirror and a cotton-tipped applicator stick. All components of Universal Protocol/PAUSE Rule were completed: Prior to beginning the procedure, a pause was taken during which the surgeon, his assistant, and the patient verbally confirmed the patient's identification, the intended procedure, and the correct surgical site.

## 2022-02-08 NOTE — PROCEDURE: MOHS SURGERY
Area L Indication Text: Tumors in this location are included in Area L (trunk and extremities).  Mohs surgery is indicated for larger tumors, tumors with aggressive histologic features, recurrent tumors, or tumors previously excised with positive margins in these anatomic locations.

## 2022-02-08 NOTE — PROCEDURE: MOHS SURGERY
Subsequent Stages Histo Method Verbiage: The presence of tumor at the surgical margins of the previous stage of Mohs micrographic surgery necessitated the excision of additional tissue for tumor clearance. Using a similar technique to that described above, a thin layer of tissue was removed from all areas where tumor was visible on the previous stage. The tissue was again marked with dyes for orientation, mapped on an image, frozen, stained, and microscopically examined by the surgeon.

## 2022-02-08 NOTE — PROCEDURE: MOHS SURGERY
Consent (Near Eyelid Margin)/Introductory Paragraph: The rationale for Mohs was explained to the patient and consent was obtained. The risks, benefits and alternatives to therapy were discussed in detail. Specifically, the risks of ectropion or eyelid deformity, infection, scarring, bleeding, prolonged wound healing, incomplete removal, allergy to anesthesia, nerve injury and recurrence were addressed. Prior to beginning the procedure, the surgical site was identified with the assistance of the patient and the medical record, including photographs and/or maps if available. The site was then clearly confirmed by the patient by direct visualization and/or the use of a hand mirror and a cotton-tipped applicator stick. All components of Universal Protocol/PAUSE Rule were completed: Prior to beginning the procedure, a pause was taken during which the surgeon, his assistant, and the patient verbally confirmed the patient's identification, the intended procedure, and the correct surgical site.

## 2022-02-08 NOTE — PROCEDURE: MOHS SURGERY
Consent (Temporal Branch)/Introductory Paragraph: The rationale for Mohs surgery was explained to the patient and written informed consent was obtained. The risks, benefits, and alternatives to Mohs Surgery were discussed in detail. Specifically, the risks of infection, scarring, bleeding, prolonged wound healing, incomplete removal, allergy to anesthesia, nerve injury, and recurrence were addressed. Given the site, the patient was also counseled about the risk of damage to the temporal branch of the facial nerve, potentially permanently removing the ability to raise the eyebrow on this side of the face. The patient expressed understanding and desired to proceed. Prior to beginning the procedure, the surgical site was identified with the assistance of the patient and the medical record, including photographs and/or maps if available. The site was then clearly confirmed by the patient by direct visualization and/or the use of a hand mirror and a cotton-tipped applicator stick. All components of Universal Protocol/PAUSE Rule were completed: Prior to beginning the procedure, a pause was taken during which the surgeon, his assistant, and the patient verbally confirmed the patient's identification, the intended procedure, and the correct surgical site.

## 2022-02-08 NOTE — PROCEDURE: MOHS SURGERY
Consent (Nose)/Introductory Paragraph: The rationale for Mohs was explained to the patient and consent was obtained. The risks, benefits and alternatives to therapy were discussed in detail. Specifically, the risks of nasal deformity, nasal asymmetry, changes in the flow of air through the nose, infection, scarring, bleeding, prolonged wound healing, incomplete removal, allergy to anesthesia, nerve injury and recurrence were addressed. Prior to beginning the procedure, the surgical site was identified with the assistance of the patient and the medical record, including photographs and/or maps if available. The site was then clearly confirmed by the patient by direct visualization and/or the use of a hand mirror and a cotton-tipped applicator stick. All components of Universal Protocol/PAUSE Rule were completed: Prior to beginning the procedure, a pause was taken during which the surgeon, his assistant, and the patient verbally confirmed the patient's identification, the intended procedure, and the correct surgical site.

## 2022-02-08 NOTE — PROCEDURE: MOHS SURGERY

## 2022-02-08 NOTE — PROCEDURE: MOHS SURGERY
Consent (Lip)/Introductory Paragraph: The rationale for Mohs was explained to the patient and consent was obtained. The risks, benefits and alternatives to therapy were discussed in detail. Specifically, the risks of lip deformity, changes in the oral aperture, infection, scarring, bleeding, prolonged wound healing, incomplete removal, allergy to anesthesia, nerve injury and recurrence were addressed. Prior to beginning the procedure, the surgical site was identified with the assistance of the patient and the medical record, including photographs and/or maps if available. The site was then clearly confirmed by the patient by direct visualization and/or the use of a hand mirror and a cotton-tipped applicator stick. All components of Universal Protocol/PAUSE Rule were completed: Prior to beginning the procedure, a pause was taken during which the surgeon, his assistant, and the patient verbally confirmed the patient's identification, the intended procedure, and the correct surgical site.

## 2022-02-08 NOTE — PROCEDURE: MOHS SURGERY
Consent (Marginal Mandibular)/Introductory Paragraph: The rationale for Mohs surgery was explained to the patient and written informed consent was obtained. The risks, benefits, and alternatives to Mohs Surgery were discussed in detail. Specifically, the risks of infection, scarring, bleeding, prolonged wound healing, incomplete removal, allergy to anesthesia, nerve injury, and recurrence were addressed. Given the site, the patient was also counseled about the risk of damage to the marginal mandibular nerve, potentially permanently resulting in an asymmetrical smile, inability to pull the lower lip downward or outward, inability to rotate lip outward, and drooling. The patient expressed understanding and desired to proceed. Prior to beginning the procedure, the surgical site was identified with the assistance of the patient and the medical record, including photographs and/or maps if available. The site was then clearly confirmed by the patient by direct visualization and/or the use of a hand mirror and a cotton-tipped applicator stick. All components of Universal Protocol/PAUSE Rule were completed: Prior to beginning the procedure, a pause was taken during which the surgeon, his assistant, and the patient verbally confirmed the patient's identification, the intended procedure, and the correct surgical site.

## 2022-02-08 NOTE — PROCEDURE: MOHS SURGERY
Area H Indication Text: Tumors in this location are included in Area H (eyelids, eyebrows, nose, lips, chin, ear, pre-auricular, post-auricular, temple, genitalia, hands, feet, ankles and areola). Mohs surgery is indicated for tumors in these anatomic locations. Tissue conservation is critical in these anatomic locations to maximize functional and aesthetic outcomes.

## 2022-02-15 ENCOUNTER — APPOINTMENT (RX ONLY)
Dept: URBAN - METROPOLITAN AREA CLINIC 331 | Facility: CLINIC | Age: 72
Setting detail: DERMATOLOGY
End: 2022-02-15

## 2022-02-15 DIAGNOSIS — L57.0 ACTINIC KERATOSIS: ICD-10-CM | Status: INADEQUATELY CONTROLLED

## 2022-02-15 DIAGNOSIS — S31000A OPEN WOUND(S) (MULTIPLE) OF UNSPECIFIED SITE(S), WITHOUT MENTION OF COMPLICATION: ICD-10-CM

## 2022-02-15 PROBLEM — S91.301A UNSPECIFIED OPEN WOUND, RIGHT FOOT, INITIAL ENCOUNTER: Status: ACTIVE | Noted: 2022-02-15

## 2022-02-15 PROCEDURE — ? TREATMENT REGIMEN

## 2022-02-15 PROCEDURE — 99213 OFFICE O/P EST LOW 20 MIN: CPT | Mod: 25

## 2022-02-15 PROCEDURE — ? COUNSELING

## 2022-02-15 PROCEDURE — 15275 SKIN SUB GRAFT FACE/NK/HF/G: CPT

## 2022-02-15 PROCEDURE — ? EPIFIX APPLICATION

## 2022-02-15 ASSESSMENT — LOCATION SIMPLE DESCRIPTION DERM
LOCATION SIMPLE: RIGHT FOOT
LOCATION SIMPLE: LEFT PRETIBIAL REGION
LOCATION SIMPLE: LEFT FOOT
LOCATION SIMPLE: RIGHT PRETIBIAL REGION

## 2022-02-15 ASSESSMENT — LOCATION DETAILED DESCRIPTION DERM
LOCATION DETAILED: LEFT DORSAL FOOT
LOCATION DETAILED: RIGHT DISTAL PRETIBIAL REGION
LOCATION DETAILED: LEFT DISTAL PRETIBIAL REGION
LOCATION DETAILED: RIGHT DORSAL FOOT

## 2022-02-15 ASSESSMENT — LOCATION ZONE DERM
LOCATION ZONE: FEET
LOCATION ZONE: LEG

## 2022-02-15 NOTE — PROCEDURE: TREATMENT REGIMEN
Otc Regimen: Differin .1% 5 days prior to PDT to areas to be treated
Plan: PDT in 1 week. Cover Mohs site
Detail Level: Zone

## 2022-02-15 NOTE — HPI: FOLLOW UP OTHER
What Is Your Reason For Requesting A Follow-Up Appointment?: dr. ocasio wanted her to do ioana-u to feet

## 2022-02-15 NOTE — PROCEDURE: EPIFIX APPLICATION
Body Location Override (Optional - Billing Will Still Be Based On Selected Body Map Location If Applicable): right medial foot

## 2022-02-22 ENCOUNTER — APPOINTMENT (RX ONLY)
Dept: URBAN - METROPOLITAN AREA CLINIC 331 | Facility: CLINIC | Age: 72
Setting detail: DERMATOLOGY
End: 2022-02-22

## 2022-02-22 DIAGNOSIS — S31000A OPEN WOUND(S) (MULTIPLE) OF UNSPECIFIED SITE(S), WITHOUT MENTION OF COMPLICATION: ICD-10-CM

## 2022-02-22 DIAGNOSIS — L57.0 ACTINIC KERATOSIS: ICD-10-CM | Status: INADEQUATELY CONTROLLED

## 2022-02-22 PROBLEM — S91.301A UNSPECIFIED OPEN WOUND, RIGHT FOOT, INITIAL ENCOUNTER: Status: ACTIVE | Noted: 2022-02-22

## 2022-02-22 PROCEDURE — ? ADDITIONAL NOTES

## 2022-02-22 PROCEDURE — 15275 SKIN SUB GRAFT FACE/NK/HF/G: CPT

## 2022-02-22 PROCEDURE — 96567 PDT DSTR PRMLG LES SKN: CPT

## 2022-02-22 PROCEDURE — ? DEBRIDEMENT OF OPEN WOUND

## 2022-02-22 PROCEDURE — ? PDT: BLUE

## 2022-02-22 PROCEDURE — ? COUNSELING

## 2022-02-22 PROCEDURE — 97597 DBRDMT OPN WND 1ST 20 CM/<: CPT | Mod: 59

## 2022-02-22 PROCEDURE — ? PRESCRIPTION

## 2022-02-22 PROCEDURE — ? EPIFIX APPLICATION

## 2022-02-22 RX ORDER — CLOBETASOL PROPIONATE 0.5 MG/G
OINTMENT TOPICAL
Qty: 45 | Refills: 0 | Status: ERX | COMMUNITY
Start: 2022-02-22

## 2022-02-22 RX ADMIN — CLOBETASOL PROPIONATE: 0.5 OINTMENT TOPICAL at 00:00

## 2022-02-22 ASSESSMENT — LOCATION SIMPLE DESCRIPTION DERM
LOCATION SIMPLE: RIGHT FOOT
LOCATION SIMPLE: RIGHT PRETIBIAL REGION
LOCATION SIMPLE: LEFT FOOT
LOCATION SIMPLE: LEFT PRETIBIAL REGION

## 2022-02-22 ASSESSMENT — LOCATION DETAILED DESCRIPTION DERM
LOCATION DETAILED: RIGHT DORSAL FOOT
LOCATION DETAILED: LEFT DISTAL PRETIBIAL REGION
LOCATION DETAILED: RIGHT DISTAL PRETIBIAL REGION
LOCATION DETAILED: LEFT DORSAL FOOT

## 2022-02-22 ASSESSMENT — LOCATION ZONE DERM
LOCATION ZONE: LEG
LOCATION ZONE: FEET

## 2022-02-22 NOTE — PROCEDURE: DEBRIDEMENT OF OPEN WOUND
Body Location Override (Optional - Billing Will Still Be Based On Selected Body Map Location If Applicable): right medial foot
Detail Level: Detailed
Procedure: Debridement of wound tissue less than 20sq cm
Size Of Wound In Cm2 (Optional): 0
Anesthesia Type: 1% lidocaine with epinephrine
Consent was obtained from the patient. The risks, benefits and alternatives to therapy were discussed in detail. Specifically, the risks of infection, scarring, bleeding, prolonged wound healing, nerve injury, and allergy to anesthesia were addressed. Alternatives to debridement, such as aggressive wound care, were also discussed.  Prior to the procedure, the treatment site was clearly identified and confirmed by the patient. All components of Universal Protocol/PAUSE Rule completed.
Render Post-Care Instructions In Note?: no
Post-Care Instructions: I reviewed with the patient in detail post-care instructions. Patient is to keep the area dry for 48 hours, and not to engage in any swimming until the area is healed. Should the patient develop any fevers, chills, bleeding, severe pain patient will contact the office immediately.

## 2022-03-01 ENCOUNTER — APPOINTMENT (RX ONLY)
Dept: URBAN - METROPOLITAN AREA CLINIC 331 | Facility: CLINIC | Age: 72
Setting detail: DERMATOLOGY
End: 2022-03-01

## 2022-03-01 DIAGNOSIS — S31000A OPEN WOUND(S) (MULTIPLE) OF UNSPECIFIED SITE(S), WITHOUT MENTION OF COMPLICATION: ICD-10-CM | Status: IMPROVED

## 2022-03-01 PROBLEM — S91.301A UNSPECIFIED OPEN WOUND, RIGHT FOOT, INITIAL ENCOUNTER: Status: ACTIVE | Noted: 2022-03-01

## 2022-03-01 PROCEDURE — 97597 DBRDMT OPN WND 1ST 20 CM/<: CPT

## 2022-03-01 PROCEDURE — ? DEBRIDEMENT OF OPEN WOUND

## 2022-03-01 ASSESSMENT — LOCATION DETAILED DESCRIPTION DERM: LOCATION DETAILED: RIGHT DORSAL FOOT

## 2022-03-01 ASSESSMENT — LOCATION ZONE DERM: LOCATION ZONE: FEET

## 2022-03-01 ASSESSMENT — LOCATION SIMPLE DESCRIPTION DERM: LOCATION SIMPLE: RIGHT FOOT

## 2022-03-01 NOTE — PROCEDURE: DEBRIDEMENT OF OPEN WOUND
Body Location Override (Optional - Billing Will Still Be Based On Selected Body Map Location If Applicable): right med foot
Detail Level: Detailed
Procedure: Debridement of wound tissue less than 20sq cm
Size Of Wound In Cm2 (Optional): 0
Consent was obtained from the patient. The risks, benefits and alternatives to therapy were discussed in detail.
Render Post-Care Instructions In Note?: no
Post-Care Instructions: I reviewed with the patient in detail post-care instructions. Bandage is to be kept dry and on until next visit in 1 week. Do not to engage in any swimming until the area is healed, and especially avoid contact with ocean water. Should the patient develop any fevers, chills, redness, bleeding, severe pain patient will contact the office immediately or seek emergent medical help.

## 2022-04-13 ENCOUNTER — APPOINTMENT (RX ONLY)
Dept: URBAN - METROPOLITAN AREA CLINIC 331 | Facility: CLINIC | Age: 72
Setting detail: DERMATOLOGY
End: 2022-04-13

## 2022-04-13 DIAGNOSIS — S31000A OPEN WOUND(S) (MULTIPLE) OF UNSPECIFIED SITE(S), WITHOUT MENTION OF COMPLICATION: ICD-10-CM | Status: RESOLVED

## 2022-04-13 DIAGNOSIS — L57.8 OTHER SKIN CHANGES DUE TO CHRONIC EXPOSURE TO NONIONIZING RADIATION: ICD-10-CM | Status: IMPROVED

## 2022-04-13 PROBLEM — S91.301A UNSPECIFIED OPEN WOUND, RIGHT FOOT, INITIAL ENCOUNTER: Status: ACTIVE | Noted: 2022-04-13

## 2022-04-13 PROCEDURE — 99212 OFFICE O/P EST SF 10 MIN: CPT

## 2022-04-13 PROCEDURE — 99024 POSTOP FOLLOW-UP VISIT: CPT

## 2022-04-13 PROCEDURE — ? POST-OP WOUND CHECK

## 2022-04-13 PROCEDURE — ? ADDITIONAL NOTES

## 2022-04-13 PROCEDURE — ? COUNSELING

## 2022-04-13 ASSESSMENT — LOCATION SIMPLE DESCRIPTION DERM
LOCATION SIMPLE: LEFT PRETIBIAL REGION
LOCATION SIMPLE: RIGHT PRETIBIAL REGION
LOCATION SIMPLE: RIGHT FOOT
LOCATION SIMPLE: LEFT FOOT

## 2022-04-13 ASSESSMENT — LOCATION DETAILED DESCRIPTION DERM
LOCATION DETAILED: LEFT DORSAL FOOT
LOCATION DETAILED: RIGHT DORSAL FOOT
LOCATION DETAILED: LEFT DISTAL PRETIBIAL REGION
LOCATION DETAILED: RIGHT DISTAL PRETIBIAL REGION

## 2022-04-13 ASSESSMENT — LOCATION ZONE DERM
LOCATION ZONE: FEET
LOCATION ZONE: LEG

## 2022-04-13 NOTE — PROCEDURE: POST-OP WOUND CHECK
Body Location Override (Optional - Billing Will Still Be Based On Selected Body Map Location If Applicable): right med foot
Detail Level: Detailed
Add 51731 Cpt? (Important Note: In 2017 The Use Of 22175 Is Being Tracked By Cms To Determine Future Global Period Reimbursement For Global Periods): yes

## 2022-05-01 ENCOUNTER — HEALTH MAINTENANCE LETTER (OUTPATIENT)
Age: 72
End: 2022-05-01

## 2022-08-16 ENCOUNTER — APPOINTMENT (RX ONLY)
Dept: URBAN - METROPOLITAN AREA CLINIC 331 | Facility: CLINIC | Age: 72
Setting detail: DERMATOLOGY
End: 2022-08-16

## 2022-08-16 DIAGNOSIS — L57.0 ACTINIC KERATOSIS: ICD-10-CM

## 2022-08-16 PROBLEM — D48.5 NEOPLASM OF UNCERTAIN BEHAVIOR OF SKIN: Status: ACTIVE | Noted: 2022-08-16

## 2022-08-16 PROCEDURE — 11102 TANGNTL BX SKIN SINGLE LES: CPT

## 2022-08-16 PROCEDURE — 17000 DESTRUCT PREMALG LESION: CPT | Mod: 59

## 2022-08-16 PROCEDURE — ? LIQUID NITROGEN

## 2022-08-16 PROCEDURE — 17003 DESTRUCT PREMALG LES 2-14: CPT

## 2022-08-16 PROCEDURE — ? ORDER FOR PHOTODYNAMIC THERAPY

## 2022-08-16 PROCEDURE — ? COUNSELING

## 2022-08-16 PROCEDURE — 11103 TANGNTL BX SKIN EA SEP/ADDL: CPT

## 2022-08-16 PROCEDURE — ? BIOPSY BY SHAVE METHOD

## 2022-08-16 ASSESSMENT — LOCATION ZONE DERM
LOCATION ZONE: LEG
LOCATION ZONE: ARM
LOCATION ZONE: HAND

## 2022-08-16 ASSESSMENT — LOCATION DETAILED DESCRIPTION DERM
LOCATION DETAILED: LEFT DISTAL PRETIBIAL REGION
LOCATION DETAILED: LEFT ANTERIOR LATERAL DISTAL THIGH
LOCATION DETAILED: LEFT PROXIMAL DORSAL FOREARM
LOCATION DETAILED: LEFT RADIAL DORSAL HAND
LOCATION DETAILED: RIGHT PROXIMAL DORSAL FOREARM
LOCATION DETAILED: RIGHT LATERAL DORSAL WRIST
LOCATION DETAILED: RIGHT DISTAL DORSAL FOREARM
LOCATION DETAILED: LEFT ANTERIOR DISTAL THIGH
LOCATION DETAILED: RIGHT DISTAL PRETIBIAL REGION
LOCATION DETAILED: RIGHT RADIAL DORSAL HAND

## 2022-08-16 ASSESSMENT — LOCATION SIMPLE DESCRIPTION DERM
LOCATION SIMPLE: LEFT THIGH
LOCATION SIMPLE: LEFT HAND
LOCATION SIMPLE: RIGHT FOREARM
LOCATION SIMPLE: RIGHT HAND
LOCATION SIMPLE: RIGHT WRIST
LOCATION SIMPLE: RIGHT PRETIBIAL REGION
LOCATION SIMPLE: LEFT PRETIBIAL REGION
LOCATION SIMPLE: LEFT FOREARM

## 2022-08-16 NOTE — PROCEDURE: LIQUID NITROGEN
Detail Level: Detailed
Post-Care Instructions: You have just had cryotherapy for the treatment of a pre-cancerous or other benign (non-cancerous) skin lesions. You can expect the pain to rapidly decrease over the next 45 minutes. The area treated will initially turn red and over the next 72 hours turn brown to black. A scab will form that will peel off by itself within 5 to 7 days. A blister or reddish purple blood blister may form where the area has been treated. When the scab forms, you can apply Vaseline or Scar Recovery Gel twice a day to the wound. This product will improve the healing of the wound, decreasing the appearance of red or pink pigment changes in the wound. The gel has also been shown to significantly decrease itching while the wound heals. You can purchase the Scar Recovery Gel at our office. \\n\\nCan I wash or use makeup? Yes\\n\\nShould I break the blister should one form? \\nIf the blister is bothersome, you may break the blister with a clean needle if the lesion is on the body. However, we do not recommend doing this for lesions on the face. Keep the area dry and as clean as possible in order to prevent infection. Natural skin is the best protection to prevent infection. \\n\\nWill this leave a scar? \\nIt could, but it is very unlikely. However, it may leave a slight discoloration, which should be temporary. \\n\\nWhat if the skin growth doesn't go away after this has healed? \\nThe providers treated this area believing it did not have cancerous roots and was strictly limited to the surface of the skin as a pre-cancerous or benign growth would be. The growth may not disappear or it may return over a period of time. You need to have this area checked again at your follow-up appointment to ensure that it does not need further treatment or that it has resolved.
Render Note In Bullet Format When Appropriate: No
Duration Of Freeze Thaw-Cycle (Seconds): 0
Show Applicator Variable?: Yes
Consent: The patient's consent was obtained including but not limited to risks of crusting, scabbing, blistering, scarring, darker or lighter pigmentary change, recurrence, incomplete removal and infection.

## 2022-08-16 NOTE — PROCEDURE: ORDER FOR PHOTODYNAMIC THERAPY
Consent: The procedure and risks were reviewed with the patient including but not limited to: burning, pigmentary changes, pain, blistering, scabbing, redness, and the possibility of needing numerous treatments. Strict photoprotection after the procedure was also discussed.\\n\\nPatient advised to use over the counter differin 5 nights before procedure
Face And Ears Incubation Time: 1 Hour
Scalp Incubation Time: 2 Hours
Pdt Type: CORDELL-U
Frequency Of Pdt: Single Treatment
Photosensitizer: Levulan
Debridement: Yes
Location: Legs
Occlusion: No
Face And Scalp Incubation Time: 1 Hour for the face and 2 Hours for the scalp
Legs Incubation Time: 75m
Detail Level: Simple

## 2022-08-30 ENCOUNTER — APPOINTMENT (RX ONLY)
Dept: URBAN - METROPOLITAN AREA CLINIC 331 | Facility: CLINIC | Age: 72
Setting detail: DERMATOLOGY
End: 2022-08-30

## 2022-08-30 DIAGNOSIS — L57.0 ACTINIC KERATOSIS: ICD-10-CM | Status: INADEQUATELY CONTROLLED

## 2022-08-30 PROCEDURE — ? PDT: BLUE

## 2022-08-30 PROCEDURE — 96573 PDT DSTR PRMLG LES PHYS/QHP: CPT

## 2022-08-30 PROCEDURE — ? PRESCRIPTION

## 2022-08-30 RX ORDER — TRIAMCINOLONE ACETONIDE 1 MG/G
CREAM TOPICAL
Qty: 80 | Refills: 1 | Status: ERX | COMMUNITY
Start: 2022-08-30

## 2022-08-30 RX ORDER — TRIAMCINOLONE ACETONIDE 1 MG/G
CREAM TOPICAL
Qty: 80 | Refills: 1 | Status: ERX

## 2022-08-30 RX ADMIN — TRIAMCINOLONE ACETONIDE: 1 CREAM TOPICAL at 00:00

## 2022-08-30 ASSESSMENT — LOCATION DETAILED DESCRIPTION DERM
LOCATION DETAILED: LEFT DISTAL PRETIBIAL REGION
LOCATION DETAILED: RIGHT DISTAL PRETIBIAL REGION

## 2022-08-30 ASSESSMENT — LOCATION SIMPLE DESCRIPTION DERM
LOCATION SIMPLE: RIGHT PRETIBIAL REGION
LOCATION SIMPLE: LEFT PRETIBIAL REGION

## 2022-08-30 ASSESSMENT — LOCATION ZONE DERM: LOCATION ZONE: LEG

## 2022-08-30 NOTE — PROCEDURE: PDT: BLUE
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 Will treat actinic damage with field therapy first then follow up in 2 weeks with

## 2022-08-30 NOTE — PROCEDURE: PDT: BLUE
Who Performed The Pdt?: Performed by MD TALYA, DELMI or NP ([id number]) Who Performed The Pdt?: Performed by MD TALYA, DELMI or NP ([id number])

## 2022-09-15 ENCOUNTER — APPOINTMENT (RX ONLY)
Dept: URBAN - METROPOLITAN AREA CLINIC 331 | Facility: CLINIC | Age: 72
Setting detail: DERMATOLOGY
End: 2022-09-15

## 2022-09-15 DIAGNOSIS — L57.8 OTHER SKIN CHANGES DUE TO CHRONIC EXPOSURE TO NONIONIZING RADIATION: ICD-10-CM | Status: RESOLVING

## 2022-09-15 PROBLEM — C44.722 SQUAMOUS CELL CARCINOMA OF SKIN OF RIGHT LOWER LIMB, INCLUDING HIP: Status: ACTIVE | Noted: 2022-09-15

## 2022-09-15 PROBLEM — C44.729 SQUAMOUS CELL CARCINOMA OF SKIN OF LEFT LOWER LIMB, INCLUDING HIP: Status: ACTIVE | Noted: 2022-09-15

## 2022-09-15 PROCEDURE — ? DEFER

## 2022-09-15 PROCEDURE — ? CONSULTATION FOR MOHS SURGERY

## 2022-09-15 PROCEDURE — 99213 OFFICE O/P EST LOW 20 MIN: CPT

## 2022-09-15 PROCEDURE — ? PDT FOLLOW-UP EVALUATION

## 2022-09-15 ASSESSMENT — LOCATION DETAILED DESCRIPTION DERM
LOCATION DETAILED: LEFT DISTAL PRETIBIAL REGION
LOCATION DETAILED: RIGHT PROXIMAL PRETIBIAL REGION

## 2022-09-15 ASSESSMENT — LOCATION SIMPLE DESCRIPTION DERM
LOCATION SIMPLE: LEFT PRETIBIAL REGION
LOCATION SIMPLE: RIGHT PRETIBIAL REGION

## 2022-09-15 ASSESSMENT — LOCATION ZONE DERM: LOCATION ZONE: LEG

## 2022-09-15 NOTE — PROCEDURE: PDT FOLLOW-UP EVALUATION
Inadequate Response - Prescribe Efudex Text: The patient has had an inadequate response to photodynamic therapy.  The remaining precancerous lesions will be treated with topical field therapy using 5-fluorouracil cream.
Partial Response - Cryotherapy Performed Today Text: The patient has had a partial response to photodynamic therapy.  The remaining precancerous lesions will be treated with cryotherapy today.
Inadequate Response - Prescribe Aldara Text: The patient has had an inadequate response to photodynamic therapy.  The remaining precancerous lesions will be treated with topical field therapy using imiquimod cream.
Partial Response - Prescribe Efudex Text: The patient has had a partial response to photodynamic therapy.  The remaining precancerous lesions will be treated with topical field therapy using 5-fluorouracil cream.
Partial Response - Prescribe Zyclara Text: The patient has had a partial response to photodynamic therapy.  The remaining precancerous lesions will be treated with topical field therapy using Zyclara cream.
Inadequate Response - Prescribe Picato Text: The patient has had an inadequate response to photodynamic therapy.  The remaining precancerous lesions will be treated with topical field therapy using Picato gel.
Inadequate Response - Prescribe Zyclara Text: The patient has had an inadequate response to photodynamic therapy.  The remaining precancerous lesions will be treated with topical field therapy using Zyclara cream.
Partial Response - Prescribe Aldara Text: The patient has had a partial response to photodynamic therapy.  The remaining precancerous lesions will be treated with topical field therapy using imiquimod cream.
Response To Pdt: Complete Response
Complete Response Text: The patient has had substantial improvement of actinic damage and actinic keratoses with photodynamic therapy.
Partial Response - Prescribe Picato Text: The patient has had a partial response to photodynamic therapy.  The remaining precancerous lesions will be treated with topical field therapy using Picato gel.
Inadequate Response - Schedule Further Pdt Text: The patient has had an inadequate response to photodynamic therapy.  The remaining precancerous lesions will be treated by further photodynamic therapy.
Partial Response - Schedule Further Pdt Text: The patient has had a partial response to photodynamic therapy.  The remaining precancerous lesions will be treated by further photodynamic therapy.
Inadequate Response - Cryotherapy Performed Today Text: The patient has had an inadequate response to photodynamic therapy.  The remaining precancerous lesions will be treated with cryotherapy today.
Detail Level: Zone

## 2022-09-15 NOTE — PROCEDURE: DEFER
Instructions (Optional): Patient has an allergy to lidocaine and epi. Per previous notes patient tolerated carbocaine well, but we are out of stock at the moment, will call to schedule patient as soon as we receive it. \\n\\nI counseled the patient that I had heard that another dermatology group in the area has this drug if she would like to be seen to have her cancers removed sooner. She noted that she would prefer to stay here with me to have these cancers treated instead, and does not mind waiting.
X Size Of Lesion In Cm (Optional): 0
Introduction Text (Please End With A Colon): The following procedure was deferred: biopsy. The patient defers the biopsy today, although I counseled her the lesion may be cancerous. She expressed understanding and noted she would have the biopsy done at her next visit next month.
Detail Level: Detailed

## 2022-09-26 ENCOUNTER — APPOINTMENT (RX ONLY)
Dept: URBAN - METROPOLITAN AREA CLINIC 331 | Facility: CLINIC | Age: 72
Setting detail: DERMATOLOGY
End: 2022-09-26

## 2022-09-26 PROBLEM — C44.729 SQUAMOUS CELL CARCINOMA OF SKIN OF LEFT LOWER LIMB, INCLUDING HIP: Status: ACTIVE | Noted: 2022-09-26

## 2022-09-26 PROCEDURE — 12032 INTMD RPR S/A/T/EXT 2.6-7.5: CPT

## 2022-09-26 PROCEDURE — 17313 MOHS 1 STAGE T/A/L: CPT

## 2022-09-26 PROCEDURE — ? PRESCRIPTION

## 2022-09-26 PROCEDURE — ? MOHS SURGERY

## 2022-09-26 RX ORDER — MUPIROCIN 20 MG/G
OINTMENT TOPICAL BID
Qty: 22 | Refills: 1 | Status: ERX | COMMUNITY
Start: 2022-09-26

## 2022-09-26 RX ORDER — MINOCYCLINE HYDROCHLORIDE 100 MG/1
CAPSULE ORAL 1
Qty: 14 | Refills: 0 | Status: ERX | COMMUNITY
Start: 2022-09-26

## 2022-09-26 RX ADMIN — MUPIROCIN: 20 OINTMENT TOPICAL at 00:00

## 2022-09-26 RX ADMIN — MINOCYCLINE HYDROCHLORIDE: 100 CAPSULE ORAL at 00:00

## 2022-09-26 NOTE — PROCEDURE: MOHS SURGERY
Post-Care Instructions: MOHS/ SURGERY POST-OPERATIVE INSTRUCTIONS\\n\\nWOUND CARE\\nKeep the bandage dry until 24 hours after surgery. Thereafter, change the bandage twice a day until sutures are removed. You may soak the bandage to help it peel off. Gently clean the wound with a Dial bar soap and water, and then gently pat the wound dry. Gently apply a thick layer of Vaseline to the wound, or Mupirocin if it has been prescribed to you. Cover the wound with a Band-Aid or non-stick gauze (e.g. Telfa®), and paper tape. Repeat this process daily until sutures are removed.\\n\\nWe do not recommend using over-the-counter antibiotic ointment given the high chance of developing allergic reactions in covered surgical wounds. Do not apply alcohol or hydrogen peroxide directly to the wound. \\n\\n \\n\\nFor hands, wrists, and forearms:\\nAfter surgery, you will be in a bulky dressing (bandage) with a velcro splint that goes from the hand to the middle of the forearm, with the fingers free. The splint is similar to a cast, however; the purpose of this splint is to decrease the mobility of your hand to prevent over working incision site. The splint protects the incision and the surgical repair, as well as lessen swelling. The splint can be removed and must be kept dry. When showering or bathing, remove bandage and the splint and refer to post op instructions for wound care.  Elevate your hand above your heart as much as possible to lessen swelling and pain. Pillows and blankets under the arm are helpful when you go to sleep.\\n\\n\\nBLEEDING\\nIt is fairly common for the incision to ooze or bleed, especially in the first several hours after surgery. This can be controlled by removing the bandage we applied and then applying constant, direct pressure to the bleeding site with a clean bandage or paper towel for 20 minutes (without peeking). If the bleeding continues, repeat the above procedure for an additional 20 minutes. If the incision continues to bleed after this time, call the office at [phone]. \\n\\nDISCOMFORT\\nIf you have discomfort following surgery, take two Extra Strength Tylenol® (total of 1,000 mg) every 6 hours OR a prescription pain medication if one has been prescribed to you. If this is not sufficient to control the pain, please call the office. AVOID medications that contain aspirin, ibuprofen, or naproxyn (e.g. Alleve®, Excedrin®, Bufferin®), as these products increase the risk of bleeding.\\n\\nSWELLING\\nLocal swelling is common after surgery. Apply an ice pack over the dressing – on for 20 minutes and off for 1 hour. Repeat until bedtime. You may continue this, if necessary, as long as the swelling persists. This will help with swelling, pain, and bleeding.\\n\\nACTIVITY\\nPlease refrain from any strenuous physical activity until your sutures have been removed. This includes lifting weights, going to the gym, or doing any type of stretching in the area the surgery was done. Any of these activities could cause your sutures to break and open your wound.\\n\\nALCOHOL\\nAvoid drinking alcohol for 48 hours following surgery, as alcohol increases the risk of bleeding.\\n\\nSMOKING\\nTo promote better healing and reduce the chance of complications, it is STRONGLY RECOMMENDED THAT YOU REFRAIN FROM SMOKING until the sutures are removed.\\n\\nSHOWERING\\nUnless instructed otherwise, you may resume showering 24 hours after surgery. \\n\\nSUTURE REMOVAL\\nWhen wounds are sutured, there are generally two layers of stitches placed. There are invisible stitches under the skin and visible ones above the skin. Stitches above the skin are removed in 1-2 weeks as instructed. Stitches under the skin absorb on their own in 8 weeks to 6 months depending on the type of material used. Occasionally, one of the stitches under the skin may work itself through the skin before being absorbed. If this occurs, call the office so we can remove this “spitting” deep suture.\\n\\nFOLLOW-UP\\nIt is imperative that you have routine follow-up with your dermatologist for skin checks. This should be arranged through your dermatologist’s office. \\n\\n\\nIf Home Health was recommended for your wound, you may contact them within 24 hours if you have not heard from them contact [facility] [phone].\\n\\n\\nIf you have any questions or concerns regarding your surgery, please call the office at \\[phone]. Post-Care Instructions: MOHS/ SURGERY POST-OPERATIVE INSTRUCTIONS\\n\\nWOUND CARE\\nKeep the bandage dry until 24 hours after surgery. Thereafter, change the bandage twice a day until sutures are removed. You may soak the bandage to help it peel off. Gently clean the wound with a Dial bar soap and water, and then gently pat the wound dry. Gently apply a thick layer of Vaseline to the wound, or Mupirocin if it has been prescribed to you. Cover the wound with a Band-Aid or non-stick gauze (e.g. Telfa®), and paper tape. Repeat this process daily until sutures are removed.\\n\\nWe do not recommend using over-the-counter antibiotic ointment given the high chance of developing allergic reactions in covered surgical wounds. Do not apply alcohol or hydrogen peroxide directly to the wound. \\n\\n \\n\\nFor hands, wrists, and forearms:\\nAfter surgery, you will be in a bulky dressing (bandage) with a velcro splint that goes from the hand to the middle of the forearm, with the fingers free. The splint is similar to a cast, however; the purpose of this splint is to decrease the mobility of your hand to prevent over working incision site. The splint protects the incision and the surgical repair, as well as lessen swelling. The splint can be removed and must be kept dry. When showering or bathing, remove bandage and the splint and refer to post op instructions for wound care.  Elevate your hand above your heart as much as possible to lessen swelling and pain. Pillows and blankets under the arm are helpful when you go to sleep.\\n\\n\\nBLEEDING\\nIt is fairly common for the incision to ooze or bleed, especially in the first several hours after surgery. This can be controlled by removing the bandage we applied and then applying constant, direct pressure to the bleeding site with a clean bandage or paper towel for 20 minutes (without peeking). If the bleeding continues, repeat the above procedure for an additional 20 minutes. If the incision continues to bleed after this time, call the office at [phone]. \\n\\nDISCOMFORT\\nIf you have discomfort following surgery, take two Extra Strength Tylenol® (total of 1,000 mg) every 6 hours OR a prescription pain medication if one has been prescribed to you. If this is not sufficient to control the pain, please call the office. AVOID medications that contain aspirin, ibuprofen, or naproxyn (e.g. Alleve®, Excedrin®, Bufferin®), as these products increase the risk of bleeding.\\n\\nSWELLING\\nLocal swelling is common after surgery. Apply an ice pack over the dressing – on for 20 minutes and off for 1 hour. Repeat until bedtime. You may continue this, if necessary, as long as the swelling persists. This will help with swelling, pain, and bleeding.\\n\\nACTIVITY\\nPlease refrain from any strenuous physical activity until your sutures have been removed. This includes lifting weights, going to the gym, or doing any type of stretching in the area the surgery was done. Any of these activities could cause your sutures to break and open your wound.\\n\\nALCOHOL\\nAvoid drinking alcohol for 48 hours following surgery, as alcohol increases the risk of bleeding.\\n\\nSMOKING\\nTo promote better healing and reduce the chance of complications, it is STRONGLY RECOMMENDED THAT YOU REFRAIN FROM SMOKING until the sutures are removed.\\n\\nSHOWERING\\nUnless instructed otherwise, you may resume showering 24 hours after surgery. \\n\\nSUTURE REMOVAL\\nWhen wounds are sutured, there are generally two layers of stitches placed. There are invisible stitches under the skin and visible ones above the skin. Stitches above the skin are removed in 1-2 weeks as instructed. Stitches under the skin absorb on their own in 8 weeks to 6 months depending on the type of material used. Occasionally, one of the stitches under the skin may work itself through the skin before being absorbed. If this occurs, call the office so we can remove this “spitting” deep suture.\\n\\nFOLLOW-UP\\nIt is imperative that you have routine follow-up with your dermatologist for skin checks. This should be arranged through your dermatologist’s office. \\n\\n\\nIf Home Health was recommended for your wound, you may contact them within 24 hours if you have not heard from them contact [facility] [phone].\\n\\n\\nIf you have any questions or concerns regarding your surgery, please call the office at \\[phone].

## 2022-09-26 NOTE — PROCEDURE: MOHS SURGERY
Consent 2/Introductory Paragraph: The rationale for Mohs surgery was explained to the patient and written informed consent was obtained. The risks, benefits, and alternatives to Mohs Surgery were discussed in detail. Specifically, the risks of infection, scarring, bleeding, prolonged wound healing, incomplete removal, allergy to anesthesia, nerve injury, and recurrence were addressed. Given the site on the leg, the patient was also counseled about the high risk of failure of surgical repair, wound falling apart (dehiscence), infection, and chronic wound formation, which could take months or longer to heal. The patient expressed understanding and desired to proceed. Prior to beginning the procedure, the surgical site was identified with the assistance of the patient and the medical record, including photographs and/or maps if available. The site was then clearly confirmed by the patient by direct visualization and/or the use of a hand mirror and a cotton-tipped applicator stick. All components of Universal Protocol/PAUSE Rule were completed: Prior to beginning the procedure, a pause was taken during which the surgeon, his assistant, and the patient verbally confirmed the patient's identification, the intended procedure, and the correct surgical site. Pause time: 8:31 AM.

## 2022-09-26 NOTE — PROCEDURE: MOHS SURGERY
Clothing/Cell Phone/PDA (specify) Z Plasty Text: The lesion was extirpated to the level of the fat with a #15 scalpel blade.  Given the location of the defect, shape of the defect and the proximity to free margins a Z-plasty was deemed most appropriate for repair.  Using a sterile surgical marker, the appropriate transposition arms of the Z-plasty were drawn incorporating the defect and placing the expected incisions within the relaxed skin tension lines where possible.    The area thus outlined was incised deep to adipose tissue with a #15 scalpel blade.  The skin margins were undermined to an appropriate distance in all directions utilizing iris scissors.  The opposing transposition arms were then transposed into place in opposite direction and anchored with interrupted buried subcutaneous sutures.

## 2022-10-12 ENCOUNTER — APPOINTMENT (RX ONLY)
Dept: URBAN - METROPOLITAN AREA CLINIC 331 | Facility: CLINIC | Age: 72
Setting detail: DERMATOLOGY
End: 2022-10-12

## 2022-10-12 DIAGNOSIS — Z48.817 ENCOUNTER FOR SURGICAL AFTERCARE FOLLOWING SURGERY ON THE SKIN AND SUBCUTANEOUS TISSUE: ICD-10-CM

## 2022-10-12 PROBLEM — C44.722 SQUAMOUS CELL CARCINOMA OF SKIN OF RIGHT LOWER LIMB, INCLUDING HIP: Status: ACTIVE | Noted: 2022-10-12

## 2022-10-12 PROCEDURE — ? PRESCRIPTION

## 2022-10-12 PROCEDURE — 17311 MOHS 1 STAGE H/N/HF/G: CPT

## 2022-10-12 PROCEDURE — 99212 OFFICE O/P EST SF 10 MIN: CPT | Mod: 25

## 2022-10-12 PROCEDURE — 17312 MOHS ADDL STAGE: CPT

## 2022-10-12 PROCEDURE — ? MOHS SURGERY

## 2022-10-12 PROCEDURE — ? POST-OP WOUND CHECK (NO GLOBAL PERIOD)

## 2022-10-12 RX ORDER — MINOCYCLINE HYDROCHLORIDE 100 MG/1
CAPSULE ORAL 1
Qty: 14 | Refills: 0 | Status: ERX | COMMUNITY
Start: 2022-10-12

## 2022-10-12 RX ORDER — MUPIROCIN 20 MG/G
OINTMENT TOPICAL BID
Qty: 22 | Refills: 1 | Status: ERX | COMMUNITY
Start: 2022-10-12

## 2022-10-12 RX ADMIN — MINOCYCLINE HYDROCHLORIDE: 100 CAPSULE ORAL at 00:00

## 2022-10-12 RX ADMIN — MUPIROCIN: 20 OINTMENT TOPICAL at 00:00

## 2022-10-12 ASSESSMENT — LOCATION SIMPLE DESCRIPTION DERM: LOCATION SIMPLE: LEFT CALF

## 2022-10-12 ASSESSMENT — LOCATION DETAILED DESCRIPTION DERM: LOCATION DETAILED: LEFT PROXIMAL CALF

## 2022-10-12 ASSESSMENT — LOCATION ZONE DERM: LOCATION ZONE: LEG

## 2022-10-12 NOTE — PROCEDURE: MOHS SURGERY
Post-Care Instructions: MOHS/ SURGERY POST-OPERATIVE INSTRUCTIONS\\n\\nWOUND CARE\\nKeep the bandage dry until 24 hours after surgery. Thereafter, change the bandage twice a day until sutures are removed. You may soak the bandage to help it peel off. Gently clean the wound with a Dial bar soap and water, and then gently pat the wound dry. Gently apply a thick layer of Vaseline to the wound, or Mupirocin if it has been prescribed to you. Cover the wound with a Band-Aid or non-stick gauze (e.g. Telfa®), and paper tape. Repeat this process daily until sutures are removed.\\n\\nWe do not recommend using over-the-counter antibiotic ointment given the high chance of developing allergic reactions in covered surgical wounds. Do not apply alcohol or hydrogen peroxide directly to the wound. \\n\\n \\n\\nFor hands, wrists, and forearms:\\nAfter surgery, you will be in a bulky dressing (bandage) with a velcro splint that goes from the hand to the middle of the forearm, with the fingers free. The splint is similar to a cast, however; the purpose of this splint is to decrease the mobility of your hand to prevent over working incision site. The splint protects the incision and the surgical repair, as well as lessen swelling. The splint can be removed and must be kept dry. When showering or bathing, remove bandage and the splint and refer to post op instructions for wound care.  Elevate your hand above your heart as much as possible to lessen swelling and pain. Pillows and blankets under the arm are helpful when you go to sleep.\\n\\n\\nBLEEDING\\nIt is fairly common for the incision to ooze or bleed, especially in the first several hours after surgery. This can be controlled by removing the bandage we applied and then applying constant, direct pressure to the bleeding site with a clean bandage or paper towel for 20 minutes (without peeking). If the bleeding continues, repeat the above procedure for an additional 20 minutes. If the incision continues to bleed after this time, call the office at [phone]. \\n\\nDISCOMFORT\\nIf you have discomfort following surgery, take two Extra Strength Tylenol® (total of 1,000 mg) every 6 hours OR a prescription pain medication if one has been prescribed to you. If this is not sufficient to control the pain, please call the office. AVOID medications that contain aspirin, ibuprofen, or naproxyn (e.g. Alleve®, Excedrin®, Bufferin®), as these products increase the risk of bleeding.\\n\\nSWELLING\\nLocal swelling is common after surgery. Apply an ice pack over the dressing – on for 20 minutes and off for 1 hour. Repeat until bedtime. You may continue this, if necessary, as long as the swelling persists. This will help with swelling, pain, and bleeding.\\n\\nACTIVITY\\nPlease refrain from any strenuous physical activity until your sutures have been removed. This includes lifting weights, going to the gym, or doing any type of stretching in the area the surgery was done. Any of these activities could cause your sutures to break and open your wound.\\n\\nALCOHOL\\nAvoid drinking alcohol for 48 hours following surgery, as alcohol increases the risk of bleeding.\\n\\nSMOKING\\nTo promote better healing and reduce the chance of complications, it is STRONGLY RECOMMENDED THAT YOU REFRAIN FROM SMOKING until the sutures are removed.\\n\\nSHOWERING\\nUnless instructed otherwise, you may resume showering 24 hours after surgery. \\n\\nSUTURE REMOVAL\\nWhen wounds are sutured, there are generally two layers of stitches placed. There are invisible stitches under the skin and visible ones above the skin. Stitches above the skin are removed in 1-2 weeks as instructed. Stitches under the skin absorb on their own in 8 weeks to 6 months depending on the type of material used. Occasionally, one of the stitches under the skin may work itself through the skin before being absorbed. If this occurs, call the office so we can remove this “spitting” deep suture.\\n\\nFOLLOW-UP\\nIt is imperative that you have routine follow-up with your dermatologist for skin checks. This should be arranged through your dermatologist’s office. \\n\\n\\nIf Home Health was recommended for your wound, you may contact them within 24 hours if you have not heard from them contact [facility] [phone].\\n\\n\\nIf you have any questions or concerns regarding your surgery, please call the office at \\[phone]. Post-Care Instructions: MOHS/ SURGERY POST-OPERATIVE INSTRUCTIONS\\n\\nWOUND CARE\\nKeep the bandage dry until 24 hours after surgery. Thereafter, change the bandage twice a day until sutures are removed. You may soak the bandage to help it peel off. Gently clean the wound with a Dial bar soap and water, and then gently pat the wound dry. Gently apply a thick layer of Vaseline to the wound, or Mupirocin if it has been prescribed to you. Cover the wound with a Band-Aid or non-stick gauze (e.g. Telfa®), and paper tape. Repeat this process daily until sutures are removed.\\n\\nWe do not recommend using over-the-counter antibiotic ointment given the high chance of developing allergic reactions in covered surgical wounds. Do not apply alcohol or hydrogen peroxide directly to the wound. \\n\\n \\n\\nFor hands, wrists, and forearms:\\nAfter surgery, you will be in a bulky dressing (bandage) with a velcro splint that goes from the hand to the middle of the forearm, with the fingers free. The splint is similar to a cast, however; the purpose of this splint is to decrease the mobility of your hand to prevent over working incision site. The splint protects the incision and the surgical repair, as well as lessen swelling. The splint can be removed and must be kept dry. When showering or bathing, remove bandage and the splint and refer to post op instructions for wound care.  Elevate your hand above your heart as much as possible to lessen swelling and pain. Pillows and blankets under the arm are helpful when you go to sleep.\\n\\n\\nBLEEDING\\nIt is fairly common for the incision to ooze or bleed, especially in the first several hours after surgery. This can be controlled by removing the bandage we applied and then applying constant, direct pressure to the bleeding site with a clean bandage or paper towel for 20 minutes (without peeking). If the bleeding continues, repeat the above procedure for an additional 20 minutes. If the incision continues to bleed after this time, call the office at [phone]. \\n\\nDISCOMFORT\\nIf you have discomfort following surgery, take two Extra Strength Tylenol® (total of 1,000 mg) every 6 hours OR a prescription pain medication if one has been prescribed to you. If this is not sufficient to control the pain, please call the office. AVOID medications that contain aspirin, ibuprofen, or naproxyn (e.g. Alleve®, Excedrin®, Bufferin®), as these products increase the risk of bleeding.\\n\\nSWELLING\\nLocal swelling is common after surgery. Apply an ice pack over the dressing – on for 20 minutes and off for 1 hour. Repeat until bedtime. You may continue this, if necessary, as long as the swelling persists. This will help with swelling, pain, and bleeding.\\n\\nACTIVITY\\nPlease refrain from any strenuous physical activity until your sutures have been removed. This includes lifting weights, going to the gym, or doing any type of stretching in the area the surgery was done. Any of these activities could cause your sutures to break and open your wound.\\n\\nALCOHOL\\nAvoid drinking alcohol for 48 hours following surgery, as alcohol increases the risk of bleeding.\\n\\nSMOKING\\nTo promote better healing and reduce the chance of complications, it is STRONGLY RECOMMENDED THAT YOU REFRAIN FROM SMOKING until the sutures are removed.\\n\\nSHOWERING\\nUnless instructed otherwise, you may resume showering 24 hours after surgery. \\n\\nSUTURE REMOVAL\\nWhen wounds are sutured, there are generally two layers of stitches placed. There are invisible stitches under the skin and visible ones above the skin. Stitches above the skin are removed in 1-2 weeks as instructed. Stitches under the skin absorb on their own in 8 weeks to 6 months depending on the type of material used. Occasionally, one of the stitches under the skin may work itself through the skin before being absorbed. If this occurs, call the office so we can remove this “spitting” deep suture.\\n\\nFOLLOW-UP\\nIt is imperative that you have routine follow-up with your dermatologist for skin checks. This should be arranged through your dermatologist’s office. \\n\\n\\nIf Home Health was recommended for your wound, you may contact them within 24 hours if you have not heard from them contact [facility] [phone].\\n\\n\\nIf you have any questions or concerns regarding your surgery, please call the office at [phone].

## 2022-10-12 NOTE — PROCEDURE: MOHS SURGERY

## 2022-10-12 NOTE — PROCEDURE: MOHS SURGERY
Consent 2/Introductory Paragraph: The rationale for Mohs surgery was explained to the patient and written informed consent was obtained. The risks, benefits, and alternatives to Mohs Surgery were discussed in detail. Specifically, the risks of infection, scarring, bleeding, prolonged wound healing, incomplete removal, allergy to anesthesia, nerve injury, and recurrence were addressed. Given the site on the leg, the patient was also counseled about the high risk of failure of surgical repair, wound falling apart (dehiscence), infection, and chronic wound formation, which could take months or longer to heal. The patient expressed understanding and desired to proceed. Prior to beginning the procedure, the surgical site was identified with the assistance of the patient and the medical record, including photographs and/or maps if available. The site was then clearly confirmed by the patient by direct visualization and/or the use of a hand mirror and a cotton-tipped applicator stick. All components of Universal Protocol/PAUSE Rule were completed: Prior to beginning the procedure, a pause was taken during which the surgeon, his assistant, and the patient verbally confirmed the patient's identification, the intended procedure, and the correct surgical site. Pause time: 8:40 AM.
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
## 2022-11-02 ENCOUNTER — APPOINTMENT (RX ONLY)
Dept: URBAN - METROPOLITAN AREA CLINIC 331 | Facility: CLINIC | Age: 72
Setting detail: DERMATOLOGY
End: 2022-11-02

## 2022-11-02 DIAGNOSIS — Z48.817 ENCOUNTER FOR SURGICAL AFTERCARE FOLLOWING SURGERY ON THE SKIN AND SUBCUTANEOUS TISSUE: ICD-10-CM | Status: RESOLVING

## 2022-11-02 DIAGNOSIS — L57.8 OTHER SKIN CHANGES DUE TO CHRONIC EXPOSURE TO NONIONIZING RADIATION: ICD-10-CM | Status: INADEQUATELY CONTROLLED

## 2022-11-02 PROCEDURE — 99213 OFFICE O/P EST LOW 20 MIN: CPT

## 2022-11-02 PROCEDURE — ? COUNSELING

## 2022-11-02 PROCEDURE — ? POST-OP WOUND CHECK (NO GLOBAL PERIOD)

## 2022-11-02 ASSESSMENT — LOCATION DETAILED DESCRIPTION DERM: LOCATION DETAILED: RIGHT LATERAL DORSAL FOOT

## 2022-11-02 ASSESSMENT — LOCATION ZONE DERM: LOCATION ZONE: FEET

## 2022-11-02 ASSESSMENT — LOCATION SIMPLE DESCRIPTION DERM: LOCATION SIMPLE: RIGHT FOOT

## 2022-11-02 NOTE — PROCEDURE: COUNSELING
Detail Level: Zone
Patient Specific Counseling (Will Not Stick From Patient To Patient): Discussed Efudex at 3 week FU, allow other site to continue to heal of right lateral dorsal foot

## 2022-11-14 NOTE — PROCEDURE: EPIFIX APPLICATION
What Type Of Note Output Would You Prefer (Optional)?: Bullet Format What Is The Reason For Today's Visit?: Annual Full Body Skin Examination with No Concerns What Is The Reason For Today's Visit? (Being Monitored For X): surveillance against the recurrence of atypical nevi Tarsorrhaphy Text: A tarsorrhaphy was performed using Frost sutures.

## 2022-11-21 ENCOUNTER — HEALTH MAINTENANCE LETTER (OUTPATIENT)
Age: 72
End: 2022-11-21

## 2022-11-23 ENCOUNTER — RX ONLY (OUTPATIENT)
Age: 72
Setting detail: RX ONLY
End: 2022-11-23

## 2022-11-23 ENCOUNTER — APPOINTMENT (RX ONLY)
Dept: URBAN - METROPOLITAN AREA CLINIC 331 | Facility: CLINIC | Age: 72
Setting detail: DERMATOLOGY
End: 2022-11-23

## 2022-11-23 DIAGNOSIS — L81.4 OTHER MELANIN HYPERPIGMENTATION: ICD-10-CM

## 2022-11-23 DIAGNOSIS — D22 MELANOCYTIC NEVI: ICD-10-CM

## 2022-11-23 DIAGNOSIS — L82.1 OTHER SEBORRHEIC KERATOSIS: ICD-10-CM

## 2022-11-23 DIAGNOSIS — Z85.828 PERSONAL HISTORY OF OTHER MALIGNANT NEOPLASM OF SKIN: ICD-10-CM

## 2022-11-23 DIAGNOSIS — L57.8 OTHER SKIN CHANGES DUE TO CHRONIC EXPOSURE TO NONIONIZING RADIATION: ICD-10-CM

## 2022-11-23 DIAGNOSIS — Z48.817 ENCOUNTER FOR SURGICAL AFTERCARE FOLLOWING SURGERY ON THE SKIN AND SUBCUTANEOUS TISSUE: ICD-10-CM

## 2022-11-23 DIAGNOSIS — L57.0 ACTINIC KERATOSIS: ICD-10-CM

## 2022-11-23 PROBLEM — D22.5 MELANOCYTIC NEVI OF TRUNK: Status: ACTIVE | Noted: 2022-11-23

## 2022-11-23 PROBLEM — D48.5 NEOPLASM OF UNCERTAIN BEHAVIOR OF SKIN: Status: ACTIVE | Noted: 2022-11-23

## 2022-11-23 PROCEDURE — 17004 DESTROY PREMAL LESIONS 15/>: CPT

## 2022-11-23 PROCEDURE — ? LIQUID NITROGEN

## 2022-11-23 PROCEDURE — ? POST-OP WOUND CHECK (NO GLOBAL PERIOD)

## 2022-11-23 PROCEDURE — 99213 OFFICE O/P EST LOW 20 MIN: CPT | Mod: 25

## 2022-11-23 PROCEDURE — ? BIOPSY BY SHAVE METHOD

## 2022-11-23 PROCEDURE — ? COUNSELING

## 2022-11-23 PROCEDURE — ? PHOTODYNAMIC THERAPY COUNSELING

## 2022-11-23 PROCEDURE — 11102 TANGNTL BX SKIN SINGLE LES: CPT | Mod: 59

## 2022-11-23 PROCEDURE — ? FULL BODY SKIN EXAM

## 2022-11-23 PROCEDURE — ? ADDITIONAL NOTES

## 2022-11-23 PROCEDURE — 11103 TANGNTL BX SKIN EA SEP/ADDL: CPT

## 2022-11-23 PROCEDURE — ? TREATMENT REGIMEN

## 2022-11-23 RX ORDER — MUPIROCIN 20 MG/G
OINTMENT TOPICAL BID
Qty: 22 | Refills: 1 | Status: ERX

## 2022-11-23 ASSESSMENT — LOCATION ZONE DERM
LOCATION ZONE: LEG
LOCATION ZONE: TRUNK
LOCATION ZONE: FEET
LOCATION ZONE: ARM
LOCATION ZONE: FACE

## 2022-11-23 ASSESSMENT — LOCATION DETAILED DESCRIPTION DERM
LOCATION DETAILED: LEFT PROXIMAL DORSAL FOREARM
LOCATION DETAILED: RIGHT INFERIOR CENTRAL MALAR CHEEK
LOCATION DETAILED: RIGHT DISTAL PRETIBIAL REGION
LOCATION DETAILED: LEFT INFERIOR CENTRAL MALAR CHEEK
LOCATION DETAILED: LEFT MEDIAL DISTAL PRETIBIAL REGION
LOCATION DETAILED: INFERIOR THORACIC SPINE
LOCATION DETAILED: RIGHT PROXIMAL DORSAL FOREARM
LOCATION DETAILED: RIGHT LATERAL DORSAL FOOT
LOCATION DETAILED: LEFT LATERAL DISTAL PRETIBIAL REGION
LOCATION DETAILED: LEFT PROXIMAL PRETIBIAL REGION
LOCATION DETAILED: LEFT MEDIAL FOREHEAD
LOCATION DETAILED: RIGHT PROXIMAL PRETIBIAL REGION
LOCATION DETAILED: RIGHT SUPERIOR MEDIAL UPPER BACK
LOCATION DETAILED: RIGHT LATERAL DISTAL PRETIBIAL REGION
LOCATION DETAILED: LEFT DISTAL PRETIBIAL REGION

## 2022-11-23 ASSESSMENT — LOCATION SIMPLE DESCRIPTION DERM
LOCATION SIMPLE: RIGHT PRETIBIAL REGION
LOCATION SIMPLE: RIGHT FOREARM
LOCATION SIMPLE: RIGHT CHEEK
LOCATION SIMPLE: LEFT PRETIBIAL REGION
LOCATION SIMPLE: RIGHT FOOT
LOCATION SIMPLE: RIGHT UPPER BACK
LOCATION SIMPLE: LEFT FOREARM
LOCATION SIMPLE: LEFT CHEEK
LOCATION SIMPLE: UPPER BACK
LOCATION SIMPLE: LEFT FOREHEAD

## 2022-11-23 NOTE — PROCEDURE: LIQUID NITROGEN
Duration Of Freeze Thaw-Cycle (Seconds): 0
Render Note In Bullet Format When Appropriate: No
Show Applicator Variable?: Yes
Post-Care Instructions: You have just had cryotherapy for the treatment of a pre-cancerous or other benign (non-cancerous) skin lesions. You can expect the pain to rapidly decrease over the next 45 minutes. The area treated will initially turn red and over the next 72 hours turn brown to black. A scab will form that will peel off by itself within 5 to 7 days. A blister or reddish purple blood blister may form where the area has been treated. When the scab forms, you can apply Vaseline or Scar Recovery Gel twice a day to the wound. This product will improve the healing of the wound, decreasing the appearance of red or pink pigment changes in the wound. The gel has also been shown to significantly decrease itching while the wound heals. You can purchase the Scar Recovery Gel at our office. \\n\\nCan I wash or use makeup? Yes\\n\\nShould I break the blister should one form? \\nIf the blister is bothersome, you may break the blister with a clean needle if the lesion is on the body. However, we do not recommend doing this for lesions on the face. Keep the area dry and as clean as possible in order to prevent infection. Natural skin is the best protection to prevent infection. \\n\\nWill this leave a scar? \\nIt could, but it is very unlikely. However, it may leave a slight discoloration, which should be temporary. \\n\\nWhat if the skin growth doesn't go away after this has healed? \\nThe providers treated this area believing it did not have cancerous roots and was strictly limited to the surface of the skin as a pre-cancerous or benign growth would be. The growth may not disappear or it may return over a period of time. You need to have this area checked again at your follow-up appointment to ensure that it does not need further treatment or that it has resolved.
Detail Level: Detailed
Consent: The patient's consent was obtained including but not limited to risks of crusting, scabbing, blistering, scarring, darker or lighter pigmentary change, recurrence, incomplete removal and infection.

## 2022-11-23 NOTE — PROCEDURE: BIOPSY BY SHAVE METHOD
Body Location Override (Optional - Billing Will Still Be Based On Selected Body Map Location If Applicable): right superior dorsal foot
Detail Level: Detailed
Depth Of Biopsy: dermis
Was A Bandage Applied: Yes
Size Of Lesion In Cm: 0
Biopsy Type: H and E
Biopsy Method: Dermablade
Anesthesia Type: 1% lidocaine with epinephrine
Anesthesia Volume In Cc (Will Not Render If 0): 0.5
Hemostasis: Electrocautery
Wound Care: Vaseline
Dressing: Band-Aid
Destruction After The Procedure: No
Type Of Destruction Used: Curettage
Curettage Text: The wound bed was treated with curettage after the biopsy was performed.
Cryotherapy Text: The wound bed was treated with cryotherapy after the biopsy was performed.
Electrodesiccation Text: The wound bed was treated with electrodesiccation after the biopsy was performed.
Electrodesiccation And Curettage Text: The wound bed was treated with electrodesiccation and curettage after the biopsy was performed.
Silver Nitrate Text: The wound bed was treated with silver nitrate after the biopsy was performed.
Lab: 6
Lab Facility: [id number]
Consent: Written consent was obtained and risks were reviewed including but not limited to scarring, infection, bleeding, scabbing, incomplete removal, nerve damage and allergy to anesthesia.
Post-Care Instructions: 1. Keep the wound dry and covered with a bandage for the first 24 hours after your biopsy. If the bandage gets wet, replace it with a dry bandage. After 48 hours, gently cleanse the wound with water and apply Vaseline. Do this twice a day. Please be careful not to dislodge the clot. After 7 days the wound can remain uncovered. Most scabs on the face do not have to be covered unless the scab may be dislodged or it may become wet. Vaseline should be applied twice a day until a scab forms. When the scab forms, you can apply Scar Recovery Gel twice a day to the wound (This is available for purchase in our office). This product will improve the healing of the wound, decreasing the likelihood of the formation of a hypertrophic scar and decreasing the appearance of red or pink pigment changes in the wound. The gel has also been shown to significantly decrease itching while the wound heals. \\n2. We do not recommend Neosporin in our practice due to the percentage of the population that can develop an allergy to it.\\n3. Showering is fine; if the bandage gets wet, just replace it with a dry bandage (no soaking in a bathtub or a pool). \\n4. Your results will take 2-3 weeks to come in. \\n** Call us if you experience any problems or have any questions.**\\nIf emergencies occur, such as hemorrhaging, go immediately to the nearest emergency room.
Notification Instructions: Patient will be notified of biopsy results. However, patient instructed to call the office if not contacted within 2-3 weeks.
Billing Type: Third-Party Bill
Information: Selecting Yes will display possible errors in your note based on the variables you have selected. This validation is only offered as a suggestion for you. PLEASE NOTE THAT THE VALIDATION TEXT WILL BE REMOVED WHEN YOU FINALIZE YOUR NOTE. IF YOU WANT TO FAX A PRELIMINARY NOTE YOU WILL NEED TO TOGGLE THIS TO 'NO' IF YOU DO NOT WANT IT IN YOUR FAXED NOTE.

## 2022-11-23 NOTE — PROCEDURE: ADDITIONAL NOTES
Additional Notes: RT recommended ioana u on face , lower legs , forearms
Detail Level: Simple
Render Risk Assessment In Note?: no

## 2022-11-29 NOTE — PROCEDURE: MOHS SURGERY
Statement Selected Ftsg Text: Given the location of the defect, shape of the defect and the proximity to free margins a full thickness skin graft was deemed most appropriate. Using a sterile surgical marker, the primary defect shape was transferred to the donor site. The area thus outlined was incised deep to adipose tissue with a #15 scalpel blade. The harvested graft was then trimmed of adipose tissue until only dermis and epidermis was left. The skin graft was then placed in the primary defect and oriented appropriately. The donor site will heal by secondary intention.

## 2022-12-19 ENCOUNTER — APPOINTMENT (RX ONLY)
Dept: URBAN - METROPOLITAN AREA CLINIC 331 | Facility: CLINIC | Age: 72
Setting detail: DERMATOLOGY
End: 2022-12-19

## 2022-12-19 PROBLEM — C44.629 SQUAMOUS CELL CARCINOMA OF SKIN OF LEFT UPPER LIMB, INCLUDING SHOULDER: Status: ACTIVE | Noted: 2022-12-19

## 2022-12-19 PROBLEM — C44.722 SQUAMOUS CELL CARCINOMA OF SKIN OF RIGHT LOWER LIMB, INCLUDING HIP: Status: ACTIVE | Noted: 2022-12-19

## 2022-12-19 PROCEDURE — 99212 OFFICE O/P EST SF 10 MIN: CPT

## 2022-12-19 PROCEDURE — ? SURGICAL DECISION MAKING

## 2022-12-19 NOTE — PROCEDURE: SURGICAL DECISION MAKING
Risk Assessment Explanation (Does Not Render In The Note): Clinical determination of the probability and/or consequences of an event, such as surgery. Clinical assessment of the level of risk is affected by the nature of the event under consideration for the patient. Modifier 57 is used to indicate an Evaluation and Management (E/M) service resulted in the initial decision to perform surgery either the day before a major surgery (90 day global) or the day of a major surgery.
Complexity (Necessary For Coding; Major - 90 Day Global With Some Exceptions; Minor - 10 Day Global): major
Identified Risk Factors Documented?: yes
Initial Decision For Surgery?: No
Discussion: 72F with a history of scleroderma and extensive squamous cell carcinomas presents for evaluation and management of several squamous cell carcinomas. Starr's history is notable for many prior squamous cell carcinomas, many of which we have treated surgically. We discussed that she is producing so much cutaneous squamous cell carcinoma that is impractical to attempt to resect it all surgically -- she noted she also does not desire extensive further surgery. I counseled her additional surgery to resect her most recent cancers could prove morbid. We had an extensive discussion today about different therapeutic options, including: surgery as above; radiation therapy, which also may be an impractical choice for such a large impacted region; intralesional injections of 5-FU or methotrexate, though there is somewhat less data for this approach; medical therapy with acitretin (soriatane), although this therapy may not be the best choice for her given her history of scleroderma. In this setting, I counseled her she may consider being seen by medical oncology for evaluation for systemic therapy with other drugs. She noted she would prefer this approach, and also that she would like to be seen by Dr. Camron Dwyer, with whom she is familiar. I called Dr. Key and spoke with her about Starr. Referral made for Dr. Dwyer.

## 2023-03-22 ENCOUNTER — APPOINTMENT (RX ONLY)
Dept: URBAN - METROPOLITAN AREA CLINIC 331 | Facility: CLINIC | Age: 73
Setting detail: DERMATOLOGY
End: 2023-03-22

## 2023-03-22 DIAGNOSIS — L81.4 OTHER MELANIN HYPERPIGMENTATION: ICD-10-CM

## 2023-03-22 DIAGNOSIS — L57.0 ACTINIC KERATOSIS: ICD-10-CM | Status: INADEQUATELY CONTROLLED

## 2023-03-22 DIAGNOSIS — Z85.828 PERSONAL HISTORY OF OTHER MALIGNANT NEOPLASM OF SKIN: ICD-10-CM

## 2023-03-22 DIAGNOSIS — L82.1 OTHER SEBORRHEIC KERATOSIS: ICD-10-CM

## 2023-03-22 DIAGNOSIS — D22 MELANOCYTIC NEVI: ICD-10-CM

## 2023-03-22 PROBLEM — C44.722 SQUAMOUS CELL CARCINOMA OF SKIN OF RIGHT LOWER LIMB, INCLUDING HIP: Status: ACTIVE | Noted: 2023-03-22

## 2023-03-22 PROBLEM — C44.629 SQUAMOUS CELL CARCINOMA OF SKIN OF LEFT UPPER LIMB, INCLUDING SHOULDER: Status: ACTIVE | Noted: 2023-03-22

## 2023-03-22 PROBLEM — D22.5 MELANOCYTIC NEVI OF TRUNK: Status: ACTIVE | Noted: 2023-03-22

## 2023-03-22 PROCEDURE — ? FULL BODY SKIN EXAM

## 2023-03-22 PROCEDURE — ? COUNSELING

## 2023-03-22 PROCEDURE — 17003 DESTRUCT PREMALG LES 2-14: CPT

## 2023-03-22 PROCEDURE — ? ADDITIONAL NOTES

## 2023-03-22 PROCEDURE — 17000 DESTRUCT PREMALG LESION: CPT

## 2023-03-22 PROCEDURE — ? LIQUID NITROGEN

## 2023-03-22 PROCEDURE — ? TREATMENT REGIMEN

## 2023-03-22 PROCEDURE — 99213 OFFICE O/P EST LOW 20 MIN: CPT | Mod: 25

## 2023-03-22 PROCEDURE — ? DEFER

## 2023-03-22 ASSESSMENT — LOCATION DETAILED DESCRIPTION DERM
LOCATION DETAILED: RIGHT RADIAL DORSAL HAND
LOCATION DETAILED: RIGHT SUPERIOR MEDIAL UPPER BACK
LOCATION DETAILED: RIGHT DISTAL DORSAL FOREARM
LOCATION DETAILED: LEFT CENTRAL MALAR CHEEK
LOCATION DETAILED: INFERIOR THORACIC SPINE

## 2023-03-22 ASSESSMENT — LOCATION SIMPLE DESCRIPTION DERM
LOCATION SIMPLE: LEFT CHEEK
LOCATION SIMPLE: RIGHT HAND
LOCATION SIMPLE: RIGHT FOREARM
LOCATION SIMPLE: UPPER BACK
LOCATION SIMPLE: RIGHT UPPER BACK

## 2023-03-22 ASSESSMENT — LOCATION ZONE DERM
LOCATION ZONE: HAND
LOCATION ZONE: ARM
LOCATION ZONE: FACE
LOCATION ZONE: TRUNK

## 2023-03-22 NOTE — PROCEDURE: DEFER
Introduction Text (Please End With A Colon): The following procedure was deferred: pt has a luncheon to go to. She will make an appt to get the AKs frozen
Other Procedure: cryotherapy
X Size Of Lesion In Cm (Optional): 0
Detail Level: Detailed
No

## 2023-03-22 NOTE — PROCEDURE: LIQUID NITROGEN
Render Post-Care Instructions In Note?: no
Consent: The patient's consent was obtained including but not limited to risks of crusting, scabbing, blistering, scarring, darker or lighter pigmentary change, recurrence, incomplete removal and infection.
Post-Care Instructions: You have just had cryotherapy for the treatment of a pre-cancerous or other benign (non-cancerous) skin lesions. You can expect the pain to rapidly decrease over the next 45 minutes. The area treated will initially turn red and over the next 72 hours turn brown to black. A scab will form that will peel off by itself within 5 to 7 days. A blister or reddish purple blood blister may form where the area has been treated. When the scab forms, you can apply Vaseline or Scar Recovery Gel twice a day to the wound. This product will improve the healing of the wound, decreasing the appearance of red or pink pigment changes in the wound. The gel has also been shown to significantly decrease itching while the wound heals. You can purchase the Scar Recovery Gel at our office. \\n\\nCan I wash or use makeup? Yes\\n\\nShould I break the blister should one form? \\nIf the blister is bothersome, you may break the blister with a clean needle if the lesion is on the body. However, we do not recommend doing this for lesions on the face. Keep the area dry and as clean as possible in order to prevent infection. Natural skin is the best protection to prevent infection. \\n\\nWill this leave a scar? \\nIt could, but it is very unlikely. However, it may leave a slight discoloration, which should be temporary. \\n\\nWhat if the skin growth doesn't go away after this has healed? \\nThe providers treated this area believing it did not have cancerous roots and was strictly limited to the surface of the skin as a pre-cancerous or benign growth would be. The growth may not disappear or it may return over a period of time. You need to have this area checked again at your follow-up appointment to ensure that it does not need further treatment or that it has resolved.
Show Applicator Variable?: Yes
Detail Level: Detailed
Duration Of Freeze Thaw-Cycle (Seconds): 0

## 2023-03-22 NOTE — HPI: SKIN LESION (SQUAMOUS CELL CARCINOMA)
Is This A New Presentation, Or A Follow-Up?: Follow Up Squamous Cell Carcinoma
Additional History: Located left distal dorsal forearm, right distal pretibial region, and right dorsal foot.\\n

## 2023-03-22 NOTE — PROCEDURE: ADDITIONAL NOTES
Detail Level: Simple
Render Risk Assessment In Note?: no
Additional Notes: Dr. KIRKLAND referred patient to Dr. Dwyer. Patient is currently on Libtayo 350mg IV every 3 weeks for the treatment of SCC. Patient has multiple areas that are suspicious for SCC. She also has extensive actinic damage. Patient does not want to do PDT, 5-FU, and wants to do minimal LN2. Due to patients scleroderma, it was deemed she was not a great surgical candidate. \\nWill discuss case with Dr. KIRKLAND and come up with treatment plan for patient.

## 2023-04-12 ENCOUNTER — APPOINTMENT (RX ONLY)
Dept: URBAN - METROPOLITAN AREA CLINIC 331 | Facility: CLINIC | Age: 73
Setting detail: DERMATOLOGY
End: 2023-04-12

## 2023-04-12 DIAGNOSIS — L57.8 OTHER SKIN CHANGES DUE TO CHRONIC EXPOSURE TO NONIONIZING RADIATION: ICD-10-CM | Status: INADEQUATELY CONTROLLED

## 2023-04-12 PROCEDURE — ? ADDITIONAL NOTES

## 2023-04-12 PROCEDURE — ? COUNSELING

## 2023-04-12 PROCEDURE — ? PRESCRIPTION

## 2023-04-12 PROCEDURE — 99213 OFFICE O/P EST LOW 20 MIN: CPT

## 2023-04-12 ASSESSMENT — LOCATION ZONE DERM: LOCATION ZONE: TRUNK

## 2023-04-12 ASSESSMENT — LOCATION DETAILED DESCRIPTION DERM
LOCATION DETAILED: INFERIOR THORACIC SPINE
LOCATION DETAILED: LEFT INFERIOR MEDIAL UPPER BACK

## 2023-04-12 ASSESSMENT — LOCATION SIMPLE DESCRIPTION DERM
LOCATION SIMPLE: UPPER BACK
LOCATION SIMPLE: LEFT UPPER BACK

## 2023-04-12 NOTE — PROCEDURE: ADDITIONAL NOTES
Additional Notes: Patient is currently on Libtayo 350mg IV every 3 weeks for the treatment of SCC (Dr. Dwyer). Patient has scleroderma, it was deemed she was not a great surgical candidate. Dr. Rascon and Jesus Mei collaborated and decided patient will not be treated in office for pre cancers or lesions suspicious of squamous cell carcinoma as these are covered under the Libtayo. Patient will continue full body exams with the practice to monitor for basal cell carcinomas and melanoma.This plan was discussed with the patient in office today and she is in agreement and had no further questions or requests.
Detail Level: Simple
Render Risk Assessment In Note?: no

## 2023-06-02 ENCOUNTER — HEALTH MAINTENANCE LETTER (OUTPATIENT)
Age: 73
End: 2023-06-02

## 2023-10-11 ENCOUNTER — APPOINTMENT (RX ONLY)
Dept: URBAN - METROPOLITAN AREA CLINIC 331 | Facility: CLINIC | Age: 73
Setting detail: DERMATOLOGY
End: 2023-10-11

## 2023-10-11 ENCOUNTER — RX ONLY (OUTPATIENT)
Age: 73
Setting detail: RX ONLY
End: 2023-10-11

## 2023-10-11 DIAGNOSIS — L57.8 OTHER SKIN CHANGES DUE TO CHRONIC EXPOSURE TO NONIONIZING RADIATION: ICD-10-CM

## 2023-10-11 DIAGNOSIS — D22 MELANOCYTIC NEVI: ICD-10-CM

## 2023-10-11 DIAGNOSIS — Z85.828 PERSONAL HISTORY OF OTHER MALIGNANT NEOPLASM OF SKIN: ICD-10-CM

## 2023-10-11 DIAGNOSIS — L82.1 OTHER SEBORRHEIC KERATOSIS: ICD-10-CM

## 2023-10-11 DIAGNOSIS — L81.4 OTHER MELANIN HYPERPIGMENTATION: ICD-10-CM

## 2023-10-11 PROBLEM — D22.5 MELANOCYTIC NEVI OF TRUNK: Status: ACTIVE | Noted: 2023-10-11

## 2023-10-11 PROCEDURE — ? ADDITIONAL NOTES

## 2023-10-11 PROCEDURE — ? TREATMENT REGIMEN

## 2023-10-11 PROCEDURE — ? COUNSELING

## 2023-10-11 PROCEDURE — ? FULL BODY SKIN EXAM

## 2023-10-11 PROCEDURE — 99213 OFFICE O/P EST LOW 20 MIN: CPT

## 2023-10-11 ASSESSMENT — LOCATION SIMPLE DESCRIPTION DERM
LOCATION SIMPLE: RIGHT UPPER BACK
LOCATION SIMPLE: UPPER BACK

## 2023-10-11 ASSESSMENT — LOCATION ZONE DERM: LOCATION ZONE: TRUNK

## 2023-10-11 ASSESSMENT — LOCATION DETAILED DESCRIPTION DERM
LOCATION DETAILED: RIGHT SUPERIOR MEDIAL UPPER BACK
LOCATION DETAILED: INFERIOR THORACIC SPINE

## 2023-10-11 NOTE — PROCEDURE: ADDITIONAL NOTES
Detail Level: Simple
Render Risk Assessment In Note?: no
Additional Notes: Patient is currently on Libtayo 350mg IV every 3 weeks for the treatment of SCC (Dr. Dwyer). Patient has scleroderma, it was deemed she was not a great surgical candidate.\\n\\nDespite being on treatment patient has multiple AKs and SCCs. Previous plan discussed with  was not to treat these and only biopsy a potential MM or BCC. Since SCCS specifically on hands and feet are getting bigger I would recommend patient next visit be with Dr. KIRKLAND and look at areas again and see if he wants to continue the same plan. \\nPatient is in agreement.

## 2023-11-08 ENCOUNTER — APPOINTMENT (RX ONLY)
Dept: URBAN - METROPOLITAN AREA CLINIC 331 | Facility: CLINIC | Age: 73
Setting detail: DERMATOLOGY
End: 2023-11-08

## 2023-11-08 DIAGNOSIS — Z85.828 PERSONAL HISTORY OF OTHER MALIGNANT NEOPLASM OF SKIN: ICD-10-CM | Status: INADEQUATELY CONTROLLED

## 2023-11-08 PROCEDURE — ? COUNSELING

## 2023-11-08 PROCEDURE — ? ADDITIONAL NOTES

## 2023-11-08 PROCEDURE — 99212 OFFICE O/P EST SF 10 MIN: CPT

## 2023-11-08 PROCEDURE — ? REFERRAL

## 2023-11-08 ASSESSMENT — LOCATION DETAILED DESCRIPTION DERM
LOCATION DETAILED: RIGHT DORSAL FOOT
LOCATION DETAILED: LEFT DORSAL FOOT

## 2023-11-08 ASSESSMENT — LOCATION SIMPLE DESCRIPTION DERM
LOCATION SIMPLE: RIGHT FOOT
LOCATION SIMPLE: LEFT FOOT

## 2023-11-08 ASSESSMENT — LOCATION ZONE DERM: LOCATION ZONE: FEET

## 2023-11-08 NOTE — PROCEDURE: ADDITIONAL NOTES
Detail Level: Simple
Render Risk Assessment In Note?: no
Additional Notes: Patient is currently off of Libtayo 350mg IV every 3 weeks for the treatment of SCC (Dr. Dwyer) due to immunotherapy hepatitis, since going off the medication the patient reports her LFTs are normalizing. We again reviewed other therapeutic options, including soriatane, intralesional 5-FU (although I do not perform these here), chemowraps. She would like to consider other options instead. \\n\\nExam reveals extensive actinic damage of the dorsal feet, and several likely squamous cell carcinomas, which the patient reports have been starting to slowly grow back since going off the Libtayo. \\n\\nGiven the extent of her disease, her medical comorbidities, and her recent history with the Libtayo, I recommended the patient she be evaluated by the [facility] for further workup and management. I counseled her they may potentially have other recommendations for her treatment. I also counseled her she could consider radiation therapy to treat her cancers, and she expressed understanding. She would like to visit the [facility] first to see what they may have to offer her.

## 2023-11-26 ENCOUNTER — HEALTH MAINTENANCE LETTER (OUTPATIENT)
Age: 73
End: 2023-11-26

## 2024-01-24 ENCOUNTER — APPOINTMENT (RX ONLY)
Dept: URBAN - METROPOLITAN AREA CLINIC 331 | Facility: CLINIC | Age: 74
Setting detail: DERMATOLOGY
End: 2024-01-24

## 2024-01-24 DIAGNOSIS — L90.5 SCAR CONDITIONS AND FIBROSIS OF SKIN: ICD-10-CM

## 2024-01-24 DIAGNOSIS — L57.8 OTHER SKIN CHANGES DUE TO CHRONIC EXPOSURE TO NONIONIZING RADIATION: ICD-10-CM

## 2024-01-24 DIAGNOSIS — D18.0 HEMANGIOMA: ICD-10-CM

## 2024-01-24 DIAGNOSIS — D22 MELANOCYTIC NEVI: ICD-10-CM

## 2024-01-24 DIAGNOSIS — L82.1 OTHER SEBORRHEIC KERATOSIS: ICD-10-CM

## 2024-01-24 DIAGNOSIS — Z85.828 PERSONAL HISTORY OF OTHER MALIGNANT NEOPLASM OF SKIN: ICD-10-CM

## 2024-01-24 PROBLEM — D18.01 HEMANGIOMA OF SKIN AND SUBCUTANEOUS TISSUE: Status: ACTIVE | Noted: 2024-01-24

## 2024-01-24 PROBLEM — D22.5 MELANOCYTIC NEVI OF TRUNK: Status: ACTIVE | Noted: 2024-01-24

## 2024-01-24 PROBLEM — D48.5 NEOPLASM OF UNCERTAIN BEHAVIOR OF SKIN: Status: ACTIVE | Noted: 2024-01-24

## 2024-01-24 PROCEDURE — ? TREATMENT REGIMEN

## 2024-01-24 PROCEDURE — ? PRESCRIPTION MEDICATION MANAGEMENT

## 2024-01-24 PROCEDURE — ? FULL BODY SKIN EXAM

## 2024-01-24 PROCEDURE — ? COUNSELING

## 2024-01-24 PROCEDURE — ? DEFER

## 2024-01-24 PROCEDURE — ? PRESCRIPTION

## 2024-01-24 PROCEDURE — 99213 OFFICE O/P EST LOW 20 MIN: CPT

## 2024-01-24 PROCEDURE — ? ADDITIONAL NOTES

## 2024-01-24 PROCEDURE — ? REFERRAL

## 2024-01-24 RX ORDER — IMIQUIMOD 12.5 MG/.25G
1 CREAM TOPICAL QHS
Qty: 24 | Refills: 0 | Status: ERX | COMMUNITY
Start: 2024-01-24

## 2024-01-24 RX ADMIN — IMIQUIMOD 1: 12.5 CREAM TOPICAL at 00:00

## 2024-01-24 ASSESSMENT — LOCATION ZONE DERM
LOCATION ZONE: FEET
LOCATION ZONE: HAND
LOCATION ZONE: TRUNK
LOCATION ZONE: LEG
LOCATION ZONE: ARM

## 2024-01-24 ASSESSMENT — LOCATION DETAILED DESCRIPTION DERM
LOCATION DETAILED: RIGHT SUPERIOR MEDIAL UPPER BACK
LOCATION DETAILED: LEFT SUPERIOR MEDIAL UPPER BACK
LOCATION DETAILED: SUPERIOR THORACIC SPINE
LOCATION DETAILED: LEFT DORSAL FOOT
LOCATION DETAILED: LEFT ANTERIOR PROXIMAL UPPER ARM
LOCATION DETAILED: RIGHT RADIAL DORSAL HAND
LOCATION DETAILED: RIGHT ANTERIOR PROXIMAL UPPER ARM
LOCATION DETAILED: RIGHT SUPERIOR UPPER BACK
LOCATION DETAILED: LEFT PROXIMAL CALF
LOCATION DETAILED: LEFT ULNAR DORSAL HAND
LOCATION DETAILED: LEFT RADIAL DORSAL HAND
LOCATION DETAILED: PERIUMBILICAL SKIN
LOCATION DETAILED: RIGHT DORSAL FOOT

## 2024-01-24 ASSESSMENT — LOCATION SIMPLE DESCRIPTION DERM
LOCATION SIMPLE: RIGHT HAND
LOCATION SIMPLE: RIGHT UPPER ARM
LOCATION SIMPLE: RIGHT UPPER BACK
LOCATION SIMPLE: RIGHT FOOT
LOCATION SIMPLE: LEFT CALF
LOCATION SIMPLE: LEFT FOOT
LOCATION SIMPLE: LEFT UPPER ARM
LOCATION SIMPLE: LEFT UPPER BACK
LOCATION SIMPLE: UPPER BACK
LOCATION SIMPLE: ABDOMEN
LOCATION SIMPLE: LEFT HAND

## 2024-01-24 NOTE — PROCEDURE: PRESCRIPTION MEDICATION MANAGEMENT
Detail Level: Zone
Initiate Treatment: Pt would like to try Imiquimod on the dorsal hands and dorsal feet, x 5 nights a week (M-F) x 6 weeks. Pt instructed to discontinue medication if wounds become troublesome.
Render In Strict Bullet Format?: No

## 2024-01-24 NOTE — PROCEDURE: ADDITIONAL NOTES
Detail Level: Simple
Additional Notes: Patient consent was obtained to proceed with the visit and recommended plan of care after discussion of all risks and benefits, including the risks of COVID-19 exposure.
Render Risk Assessment In Note?: no
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

## 2024-01-24 NOTE — PROCEDURE: DEFER
Detail Level: Detailed
Size Of Lesion In Cm (Optional): 0
Instructions (Optional): Patient is currently off of Libtayo 350mg IV every 3 weeks for the treatment of SCC (Dr. Cabrrea-\\[facility]) due to immunotherapy hepatitis, since going off the medication the patient reports her LFTs\\nare normalizing. We again reviewed other therapeutic options today, including soriatane, intralesional 5-FU\\n(although I do not perform these here), chemowraps, radiation therapy. She would like to consider other options\\ninstead.\\nExam reveals extensive actinic damage of the dorsal feet, and several likely squamous cell\\ncarcinomas, which the patient reports have been starting to slowly grow back since going off the\\nLibtayo. Today's exam and the patient's report also reveals that these cancers are approximately stable in size and number compared to her last visit with me.\\n\\nGiven the extent of her disease, her medical comorbidities, and her recent history with the Libtayo, I\\nrecommended the patient she be evaluated by the [facility] for further workup and\\nmanagement. I counseled her they may potentially have other recommendations for her treatment. I\\nalso counseled her she could consider radiation therapy to treat her cancers, and she expressed\\nunderstanding. She would like to visit the [facility] first to see what they may have to offer her. We again referred the patient to the [facility] for further workup and management.
Reason To Defer Override: see note
Introduction Text (Please End With A Colon): The following procedure was deferred: biopsy. The patient defers the biopsy today, although I counseled her the lesion may be cancerous. She expressed understanding and noted she would have the biopsy done at her next visit next month.

## 2024-04-18 RX ORDER — IMIQUIMOD 12.5 MG/.25G
1 CREAM TOPICAL QHS
Qty: 24 | Refills: 0 | Status: CANCELLED
Stop reason: ENTERED-IN-ERROR

## 2024-04-22 ENCOUNTER — RX ONLY (OUTPATIENT)
Age: 74
Setting detail: RX ONLY
End: 2024-04-22

## 2024-04-22 RX ORDER — IMIQUIMOD 37.5 MG/G
1 CREAM TOPICAL AS DIRECTED
Qty: 28 | Refills: 2 | Status: ERX | COMMUNITY
Start: 2024-04-22

## 2024-08-13 NOTE — PROCEDURE: PDT: BLUE
Anesthesia Type: 1% lidocaine with epinephrine [Normal] : normal venous pressure, no carotid bruit [Normal S1, S2] : normal S1, S2 [No Gallop] : no gallop [Clear Lung Fields] : clear lung fields [Moves all extremities] : moves all extremities [de-identified] : MIREYA [de-identified] : 1+ edema left lower extremity trace right lower extremity [de-identified] : Alert

## 2024-09-12 ENCOUNTER — APPOINTMENT (RX ONLY)
Dept: URBAN - METROPOLITAN AREA CLINIC 331 | Facility: CLINIC | Age: 74
Setting detail: DERMATOLOGY
End: 2024-09-12

## 2024-09-12 DIAGNOSIS — L08.9 LOCAL INFECTION OF THE SKIN AND SUBCUTANEOUS TISSUE, UNSPECIFIED: ICD-10-CM | Status: INADEQUATELY CONTROLLED

## 2024-09-12 DIAGNOSIS — L98429 CHRONIC ULCER OF OTHER SPECIFIED SITES: ICD-10-CM

## 2024-09-12 DIAGNOSIS — L98419 CHRONIC ULCER OF OTHER SPECIFIED SITES: ICD-10-CM

## 2024-09-12 PROBLEM — L97.519 NON-PRESSURE CHRONIC ULCER OF OTHER PART OF RIGHT FOOT WITH UNSPECIFIED SEVERITY: Status: ACTIVE | Noted: 2024-09-12

## 2024-09-12 PROCEDURE — ? PRESCRIPTION

## 2024-09-12 PROCEDURE — ? ORDER TESTS

## 2024-09-12 PROCEDURE — ? COUNSELING

## 2024-09-12 PROCEDURE — ? PRESCRIPTION MEDICATION MANAGEMENT

## 2024-09-12 PROCEDURE — 99214 OFFICE O/P EST MOD 30 MIN: CPT

## 2024-09-12 RX ORDER — CEPHALEXIN 500 MG/1
CAPSULE ORAL BID
Qty: 14 | Refills: 0 | Status: ERX | COMMUNITY
Start: 2024-09-12

## 2024-09-12 RX ORDER — MUPIROCIN 20 MG/G
OINTMENT TOPICAL BID
Qty: 15 | Refills: 0 | Status: ERX | COMMUNITY
Start: 2024-09-12

## 2024-09-12 RX ADMIN — CEPHALEXIN: 500 CAPSULE ORAL at 00:00

## 2024-09-12 RX ADMIN — MUPIROCIN: 20 OINTMENT TOPICAL at 00:00

## 2024-09-12 ASSESSMENT — LOCATION DETAILED DESCRIPTION DERM: LOCATION DETAILED: RIGHT DORSAL FOOT

## 2024-09-12 ASSESSMENT — LOCATION ZONE DERM: LOCATION ZONE: FEET

## 2024-09-12 ASSESSMENT — LOCATION SIMPLE DESCRIPTION DERM: LOCATION SIMPLE: RIGHT FOOT

## 2024-09-12 NOTE — PROCEDURE: ORDER TESTS
Billing Type: Third-Party Bill
Expected Date Of Service: 09/12/2024
Lab Facility: [id number]
Performing Laboratory: [id number]
Bill For Surgical Tray: no

## 2024-09-12 NOTE — PROCEDURE: PRESCRIPTION MEDICATION MANAGEMENT
Detail Level: Zone
Samples Given: Clobetasol
Render In Strict Bullet Format?: No
Plan: Patient reports she went to [facility] and has [place] Kenalog injected into SCC, she was then instructed to apply topical efudex. Patient presents due to severe pain and swelling in her leg. On exam leg is warm to touch and is ulcerated. Culture was taken and patient was placed on cephalexin. Patient will follow up in 1 week.

## 2024-09-18 ENCOUNTER — APPOINTMENT (RX ONLY)
Dept: URBAN - METROPOLITAN AREA CLINIC 331 | Facility: CLINIC | Age: 74
Setting detail: DERMATOLOGY
End: 2024-09-18

## 2024-09-18 DIAGNOSIS — L08.9 LOCAL INFECTION OF THE SKIN AND SUBCUTANEOUS TISSUE, UNSPECIFIED: ICD-10-CM

## 2024-09-18 PROCEDURE — ? COUNSELING

## 2024-09-18 PROCEDURE — ? PRESCRIPTION MEDICATION MANAGEMENT

## 2024-09-18 PROCEDURE — 99214 OFFICE O/P EST MOD 30 MIN: CPT

## 2024-09-18 ASSESSMENT — LOCATION ZONE DERM: LOCATION ZONE: FEET

## 2024-09-18 ASSESSMENT — LOCATION DETAILED DESCRIPTION DERM: LOCATION DETAILED: RIGHT DORSAL FOOT

## 2024-09-18 ASSESSMENT — LOCATION SIMPLE DESCRIPTION DERM: LOCATION SIMPLE: RIGHT FOOT

## 2024-09-18 NOTE — PROCEDURE: PRESCRIPTION MEDICATION MANAGEMENT
Render In Strict Bullet Format?: No
Detail Level: Zone
Continue Regimen: Apply Mupirocin to ulcer on right foot twice daily
Plan: Patient reports she went to [facility] and has [place] Kenalog injected into SCC, she was then instructed to apply topical efudex. Patient presents due to severe pain and swelling in her leg. On exam leg is warm to touch and is ulcerated. Culture was taken and patient was placed on cephalexin. Patient will follow up in 1 week.\\n\\n9/18/24: Pain and swelling have decreased. Patient is able to walk better. Continue topical antibiotic daily. Use clobetasol (samples given) once a day on foot. Follow up with [facility] on Monday.

## 2024-10-14 RX ORDER — MUPIROCIN 20 MG/G
OINTMENT TOPICAL BID
Qty: 15 | Refills: 0 | Status: ERX